# Patient Record
Sex: FEMALE | Race: WHITE | Employment: OTHER | URBAN - METROPOLITAN AREA
[De-identification: names, ages, dates, MRNs, and addresses within clinical notes are randomized per-mention and may not be internally consistent; named-entity substitution may affect disease eponyms.]

---

## 2017-03-01 PROBLEM — G47.33 OSA (OBSTRUCTIVE SLEEP APNEA): Status: ACTIVE | Noted: 2017-03-01

## 2018-02-15 PROBLEM — E03.9 HYPOTHYROIDISM: Status: ACTIVE | Noted: 2018-02-15

## 2018-04-14 ENCOUNTER — PATIENT MESSAGE (OUTPATIENT)
Dept: FAMILY MEDICINE CLINIC | Age: 48
End: 2018-04-14

## 2018-04-14 DIAGNOSIS — E23.0 PANHYPOPITUITARISM (HCC): ICD-10-CM

## 2018-04-16 RX ORDER — LEVOTHYROXINE SODIUM 0.05 MG/1
TABLET ORAL
Qty: 30 TABLET | Refills: 3 | Status: SHIPPED | OUTPATIENT
Start: 2018-04-16 | End: 2018-07-19 | Stop reason: SDUPTHER

## 2018-07-19 DIAGNOSIS — E23.0 PANHYPOPITUITARISM (HCC): ICD-10-CM

## 2018-07-19 RX ORDER — LEVOTHYROXINE SODIUM 0.05 MG/1
TABLET ORAL
Qty: 90 TABLET | Refills: 1 | Status: SHIPPED | OUTPATIENT
Start: 2018-07-19 | End: 2019-02-11 | Stop reason: SDUPTHER

## 2018-07-19 NOTE — TELEPHONE ENCOUNTER
From: Merlin Mcfarlane  Sent: 7/18/2018 8:54 PM EDT  Subject: Medication Renewal Request    Kanchan Arita.  Delores Carmona would like a refill of the following medications:     levothyroxine (SYNTHROID) 50 MCG tablet Heath Pineda DO]    Preferred pharmacy: [place] [id number]

## 2018-07-25 DIAGNOSIS — Z76.0 MEDICATION REFILL: ICD-10-CM

## 2018-07-25 RX ORDER — MULTIVIT-MIN/IRON/FOLIC ACID/K 18-600-40
2000 CAPSULE ORAL DAILY
Qty: 90 CAPSULE | Refills: 1 | Status: SHIPPED | OUTPATIENT
Start: 2018-07-25 | End: 2019-01-05 | Stop reason: SDUPTHER

## 2018-10-18 ENCOUNTER — TELEPHONE (OUTPATIENT)
Dept: FAMILY MEDICINE CLINIC | Age: 48
End: 2018-10-18

## 2018-10-18 DIAGNOSIS — E23.0 PANHYPOPITUITARISM (HCC): ICD-10-CM

## 2018-10-18 RX ORDER — HYDROCORTISONE 10 MG/1
TABLET ORAL
Qty: 30 TABLET | Refills: 5 | Status: SHIPPED | OUTPATIENT
Start: 2018-10-18 | End: 2019-02-15 | Stop reason: SDUPTHER

## 2019-01-05 DIAGNOSIS — Z76.0 MEDICATION REFILL: ICD-10-CM

## 2019-01-07 RX ORDER — ACETAMINOPHEN 160 MG
TABLET,DISINTEGRATING ORAL
Qty: 90 CAPSULE | Refills: 1 | Status: SHIPPED | OUTPATIENT
Start: 2019-01-07 | End: 2019-02-15 | Stop reason: SDUPTHER

## 2019-02-11 ENCOUNTER — TELEPHONE (OUTPATIENT)
Dept: FAMILY MEDICINE CLINIC | Age: 49
End: 2019-02-11

## 2019-02-11 DIAGNOSIS — E23.0 PANHYPOPITUITARISM (HCC): ICD-10-CM

## 2019-02-11 RX ORDER — LEVOTHYROXINE SODIUM 0.05 MG/1
TABLET ORAL
Qty: 30 TABLET | Refills: 0 | Status: SHIPPED | OUTPATIENT
Start: 2019-02-11 | End: 2019-02-15 | Stop reason: SDUPTHER

## 2019-02-15 ENCOUNTER — HOSPITAL ENCOUNTER (OUTPATIENT)
Age: 49
Discharge: HOME | End: 2019-02-17
Payer: COMMERCIAL

## 2019-02-15 ENCOUNTER — OFFICE VISIT (OUTPATIENT)
Dept: FAMILY MEDICINE CLINIC | Age: 49
End: 2019-02-15
Payer: COMMERCIAL

## 2019-02-15 VITALS
DIASTOLIC BLOOD PRESSURE: 80 MMHG | WEIGHT: 156 LBS | BODY MASS INDEX: 32.75 KG/M2 | HEIGHT: 58 IN | RESPIRATION RATE: 18 BRPM | TEMPERATURE: 98.5 F | OXYGEN SATURATION: 98 % | SYSTOLIC BLOOD PRESSURE: 118 MMHG | HEART RATE: 75 BPM

## 2019-02-15 DIAGNOSIS — E78.2 MIXED HYPERLIPIDEMIA: ICD-10-CM

## 2019-02-15 DIAGNOSIS — R53.83 FATIGUE, UNSPECIFIED TYPE: ICD-10-CM

## 2019-02-15 DIAGNOSIS — E55.9 VITAMIN D DEFICIENCY: ICD-10-CM

## 2019-02-15 DIAGNOSIS — E23.0 PANHYPOPITUITARISM (HCC): ICD-10-CM

## 2019-02-15 DIAGNOSIS — E03.9 HYPOTHYROIDISM, UNSPECIFIED TYPE: ICD-10-CM

## 2019-02-15 DIAGNOSIS — R73.01 IFG (IMPAIRED FASTING GLUCOSE): ICD-10-CM

## 2019-02-15 DIAGNOSIS — Z76.0 MEDICATION REFILL: ICD-10-CM

## 2019-02-15 DIAGNOSIS — E23.0 PANHYPOPITUITARISM (HCC): Primary | ICD-10-CM

## 2019-02-15 DIAGNOSIS — Q04.4 SEPTO-OPTIC DYSPLASIA (HCC): ICD-10-CM

## 2019-02-15 LAB
ALBUMIN SERPL-MCNC: 4.8 G/DL (ref 3.5–5.2)
ALP BLD-CCNC: 83 U/L (ref 35–104)
ALT SERPL-CCNC: 25 U/L (ref 0–32)
ANION GAP SERPL CALCULATED.3IONS-SCNC: 11 MMOL/L (ref 7–16)
AST SERPL-CCNC: 26 U/L (ref 0–31)
BASOPHILS ABSOLUTE: 0.07 E9/L (ref 0–0.2)
BASOPHILS RELATIVE PERCENT: 0.9 % (ref 0–2)
BILIRUB SERPL-MCNC: 0.4 MG/DL (ref 0–1.2)
BUN BLDV-MCNC: 13 MG/DL (ref 6–20)
CALCIUM SERPL-MCNC: 9.7 MG/DL (ref 8.6–10.2)
CHLORIDE BLD-SCNC: 101 MMOL/L (ref 98–107)
CHOLESTEROL, TOTAL: 245 MG/DL (ref 0–199)
CO2: 27 MMOL/L (ref 22–29)
CORTISOL TOTAL: 4.73 MCG/DL (ref 2.68–18.4)
CREAT SERPL-MCNC: 0.9 MG/DL (ref 0.5–1)
EOSINOPHILS ABSOLUTE: 0.1 E9/L (ref 0.05–0.5)
EOSINOPHILS RELATIVE PERCENT: 1.2 % (ref 0–6)
GFR AFRICAN AMERICAN: >60
GFR NON-AFRICAN AMERICAN: >60 ML/MIN/1.73
GLUCOSE BLD-MCNC: 121 MG/DL (ref 74–99)
HBA1C MFR BLD: 6.3 % (ref 4–5.6)
HCT VFR BLD CALC: 44.4 % (ref 34–48)
HDLC SERPL-MCNC: 54 MG/DL
HEMOGLOBIN: 14.5 G/DL (ref 11.5–15.5)
IMMATURE GRANULOCYTES #: 0.02 E9/L
IMMATURE GRANULOCYTES %: 0.2 % (ref 0–5)
LDL CHOLESTEROL CALCULATED: 167 MG/DL (ref 0–99)
LYMPHOCYTES ABSOLUTE: 2.99 E9/L (ref 1.5–4)
LYMPHOCYTES RELATIVE PERCENT: 37 % (ref 20–42)
MCH RBC QN AUTO: 28.4 PG (ref 26–35)
MCHC RBC AUTO-ENTMCNC: 32.7 % (ref 32–34.5)
MCV RBC AUTO: 86.9 FL (ref 80–99.9)
MONOCYTES ABSOLUTE: 0.43 E9/L (ref 0.1–0.95)
MONOCYTES RELATIVE PERCENT: 5.3 % (ref 2–12)
NEUTROPHILS ABSOLUTE: 4.48 E9/L (ref 1.8–7.3)
NEUTROPHILS RELATIVE PERCENT: 55.4 % (ref 43–80)
PDW BLD-RTO: 12.6 FL (ref 11.5–15)
PLATELET # BLD: 242 E9/L (ref 130–450)
PMV BLD AUTO: 12.4 FL (ref 7–12)
POTASSIUM SERPL-SCNC: 4.5 MMOL/L (ref 3.5–5)
RBC # BLD: 5.11 E12/L (ref 3.5–5.5)
SODIUM BLD-SCNC: 139 MMOL/L (ref 132–146)
TOTAL PROTEIN: 7.9 G/DL (ref 6.4–8.3)
TRIGL SERPL-MCNC: 118 MG/DL (ref 0–149)
TSH SERPL DL<=0.05 MIU/L-ACNC: 1.29 UIU/ML (ref 0.27–4.2)
VLDLC SERPL CALC-MCNC: 24 MG/DL
WBC # BLD: 8.1 E9/L (ref 4.5–11.5)

## 2019-02-15 PROCEDURE — G8417 CALC BMI ABV UP PARAM F/U: HCPCS | Performed by: FAMILY MEDICINE

## 2019-02-15 PROCEDURE — 84443 ASSAY THYROID STIM HORMONE: CPT

## 2019-02-15 PROCEDURE — 80053 COMPREHEN METABOLIC PANEL: CPT

## 2019-02-15 PROCEDURE — G8484 FLU IMMUNIZE NO ADMIN: HCPCS | Performed by: FAMILY MEDICINE

## 2019-02-15 PROCEDURE — G8427 DOCREV CUR MEDS BY ELIG CLIN: HCPCS | Performed by: FAMILY MEDICINE

## 2019-02-15 PROCEDURE — 83036 HEMOGLOBIN GLYCOSYLATED A1C: CPT

## 2019-02-15 PROCEDURE — 80061 LIPID PANEL: CPT

## 2019-02-15 PROCEDURE — 82533 TOTAL CORTISOL: CPT

## 2019-02-15 PROCEDURE — 1036F TOBACCO NON-USER: CPT | Performed by: FAMILY MEDICINE

## 2019-02-15 PROCEDURE — 85025 COMPLETE CBC W/AUTO DIFF WBC: CPT

## 2019-02-15 PROCEDURE — 82652 VIT D 1 25-DIHYDROXY: CPT

## 2019-02-15 PROCEDURE — 99214 OFFICE O/P EST MOD 30 MIN: CPT | Performed by: FAMILY MEDICINE

## 2019-02-15 RX ORDER — CHLORHEXIDINE GLUCONATE 0.12 MG/ML
RINSE ORAL
Qty: 473 ML | Refills: 1 | Status: SHIPPED | OUTPATIENT
Start: 2019-02-15 | End: 2019-08-31 | Stop reason: SDUPTHER

## 2019-02-15 RX ORDER — HYDROCORTISONE 10 MG/1
TABLET ORAL
Qty: 90 TABLET | Refills: 1 | Status: SHIPPED
Start: 2019-02-15 | End: 2020-02-13 | Stop reason: SDUPTHER

## 2019-02-15 RX ORDER — MELATONIN
1000 DAILY
Qty: 90 TABLET | Refills: 1 | OUTPATIENT
Start: 2019-02-15 | End: 2020-02-13

## 2019-02-15 RX ORDER — ACETAMINOPHEN 160 MG
TABLET,DISINTEGRATING ORAL
Qty: 90 CAPSULE | Refills: 1 | Status: SHIPPED | OUTPATIENT
Start: 2019-02-15 | End: 2019-12-30

## 2019-02-15 RX ORDER — AMOXICILLIN 500 MG
1 CAPSULE ORAL DAILY
Qty: 90 CAPSULE | Refills: 1 | OUTPATIENT
Start: 2019-02-15 | End: 2019-02-20 | Stop reason: SDUPTHER

## 2019-02-15 RX ORDER — LEVOTHYROXINE SODIUM 0.05 MG/1
TABLET ORAL
Qty: 90 TABLET | Refills: 1 | Status: SHIPPED | OUTPATIENT
Start: 2019-02-15 | End: 2019-08-31 | Stop reason: SDUPTHER

## 2019-02-15 ASSESSMENT — PATIENT HEALTH QUESTIONNAIRE - PHQ9
SUM OF ALL RESPONSES TO PHQ9 QUESTIONS 1 & 2: 0
SUM OF ALL RESPONSES TO PHQ QUESTIONS 1-9: 0
2. FEELING DOWN, DEPRESSED OR HOPELESS: 0
1. LITTLE INTEREST OR PLEASURE IN DOING THINGS: 0
SUM OF ALL RESPONSES TO PHQ QUESTIONS 1-9: 0

## 2019-02-18 PROBLEM — E78.2 MIXED HYPERLIPIDEMIA: Status: ACTIVE | Noted: 2019-02-18

## 2019-02-18 LAB — VITAMIN D 1,25-DIHYDROXY: 40 PG/ML (ref 19.9–79.3)

## 2019-02-18 ASSESSMENT — ENCOUNTER SYMPTOMS
ABDOMINAL PAIN: 0
FACIAL SWELLING: 0
SHORTNESS OF BREATH: 0
RECTAL PAIN: 0
SINUS PAIN: 0
PHOTOPHOBIA: 0
WHEEZING: 0
ANAL BLEEDING: 0
TROUBLE SWALLOWING: 0
EYE PAIN: 0
EYE REDNESS: 0
DIARRHEA: 0
EYE ITCHING: 0
SINUS PRESSURE: 0
VOMITING: 0
ALLERGIC/IMMUNOLOGIC NEGATIVE: 1
VOICE CHANGE: 0
STRIDOR: 0
COUGH: 0
RESPIRATORY NEGATIVE: 1
COLOR CHANGE: 0
CONSTIPATION: 0
CHOKING: 0
NAUSEA: 0
SORE THROAT: 0
BACK PAIN: 0
EYE DISCHARGE: 0
ABDOMINAL DISTENTION: 0
RHINORRHEA: 0
BLOOD IN STOOL: 0
CHEST TIGHTNESS: 0

## 2019-02-19 ENCOUNTER — PATIENT MESSAGE (OUTPATIENT)
Dept: FAMILY MEDICINE CLINIC | Age: 49
End: 2019-02-19

## 2019-02-20 RX ORDER — AMOXICILLIN 500 MG
2 CAPSULE ORAL DAILY
Qty: 180 CAPSULE | Refills: 1 | Status: SHIPPED
Start: 2019-02-20 | End: 2020-03-16

## 2019-07-10 ENCOUNTER — HOSPITAL ENCOUNTER (EMERGENCY)
Age: 49
Discharge: HOME | End: 2019-07-10
Attending: FAMILY MEDICINE
Payer: COMMERCIAL

## 2019-07-10 VITALS
HEIGHT: 59 IN | WEIGHT: 145 LBS | RESPIRATION RATE: 16 BRPM | OXYGEN SATURATION: 97 % | TEMPERATURE: 98 F | HEART RATE: 72 BPM | BODY MASS INDEX: 29.23 KG/M2 | DIASTOLIC BLOOD PRESSURE: 88 MMHG | SYSTOLIC BLOOD PRESSURE: 148 MMHG

## 2019-07-10 DIAGNOSIS — K04.7 DENTAL ABSCESS: Primary | ICD-10-CM

## 2019-07-10 PROCEDURE — 99282 EMERGENCY DEPT VISIT SF MDM: CPT

## 2019-07-10 RX ORDER — AMOXICILLIN 500 MG/1
500 CAPSULE ORAL 3 TIMES DAILY
Qty: 30 CAPSULE | Refills: 0 | Status: SHIPPED | OUTPATIENT
Start: 2019-07-10 | End: 2019-07-20

## 2019-07-10 RX ORDER — IBUPROFEN 800 MG/1
800 TABLET ORAL EVERY 8 HOURS PRN
Qty: 21 TABLET | Refills: 0 | Status: SHIPPED | OUTPATIENT
Start: 2019-07-10 | End: 2020-03-05

## 2019-07-10 ASSESSMENT — PAIN DESCRIPTION - ORIENTATION: ORIENTATION: RIGHT;UPPER

## 2019-07-10 ASSESSMENT — PAIN DESCRIPTION - PAIN TYPE: TYPE: ACUTE PAIN

## 2019-07-10 ASSESSMENT — PAIN DESCRIPTION - DESCRIPTORS: DESCRIPTORS: ACHING;THROBBING;STABBING

## 2019-07-10 ASSESSMENT — PAIN SCALES - GENERAL: PAINLEVEL_OUTOF10: 9

## 2019-07-10 ASSESSMENT — PAIN DESCRIPTION - FREQUENCY: FREQUENCY: CONTINUOUS

## 2019-07-10 ASSESSMENT — PAIN DESCRIPTION - LOCATION: LOCATION: TEETH

## 2019-07-11 NOTE — ED PROVIDER NOTES
52 yo female with toothache without facial swelling fever or chills   Dentist out of town   History of periodontal disease   nonsmoker          Review of Systems   HENT: Positive for dental problem. All other systems reviewed and are negative. Physical Exam   Constitutional: She is oriented to person, place, and time. She appears well-developed and well-nourished. HENT:   Head: Normocephalic and atraumatic. Mouth/Throat: Dental abscesses and dental caries present. Eyes: Pupils are equal, round, and reactive to light. Neck: Normal range of motion. Neck supple. Cardiovascular: Normal rate, regular rhythm and normal heart sounds. No murmur heard. Pulmonary/Chest: Effort normal and breath sounds normal. No respiratory distress. She has no wheezes. She has no rales. Abdominal: Soft. Bowel sounds are normal. There is no tenderness. There is no rebound and no guarding. Musculoskeletal: She exhibits no edema. Neurological: She is alert and oriented to person, place, and time. No cranial nerve deficit. Coordination normal.   Skin: Skin is warm and dry. Nursing note and vitals reviewed. Procedures    MDM         --------------------------------------------- PAST HISTORY ---------------------------------------------  Past Medical History:  has a past medical history of Blind, Hypoglycemia, Hypothyroidism, Osteopenia, Pituitary abnormality ([facility] [address] [facility] and Sleep apnea. Past Surgical History:  has a past surgical history that includes skin biopsy (2009) and Tonsillectomy (1978). Social History:  reports that she has never smoked. She has never used smokeless tobacco. She reports that she does not drink alcohol or use drugs. Family History: family history includes Cancer in her maternal grandmother; Heart Disease in her father; No Known Problems in her sister and sister; Other in her mother. The patients home medications have been reviewed.     Allergies: Patient has no known allergies. -------------------------------------------------- RESULTS -------------------------------------------------  No results found for this visit on 07/10/19. No orders to display       ------------------------- NURSING NOTES AND VITALS REVIEWED ---------------------------   The nursing notes within the ED encounter and vital signs as below have been reviewed. BP (!) 162/112   Pulse 72   Temp 98 °F (36.7 °C) (Oral)   Resp 16   Ht 4' 10.5\" (1.486 m)   Wt 145 lb (65.8 kg)   LMP 09/27/2011 (Exact Date)   SpO2 97%   BMI 29.79 kg/m²   Oxygen Saturation Interpretation: Normal      ------------------------------------------ PROGRESS NOTES ------------------------------------------   I have spoken with the patient and discussed todays results, in addition to providing specific details for the plan of care and counseling regarding the diagnosis and prognosis. Their questions are answered at this time and they are agreeable with the plan.      --------------------------------- ADDITIONAL PROVIDER NOTES ---------------------------------     1. Dental abscess           This patient is stable for discharge. I have shared the specific conditions for return, as well as the importance of follow-up.            Drexel Bamberger, DO  07/10/19 1040 [facility],   07/10/19 2730

## 2019-08-31 DIAGNOSIS — E03.9 HYPOTHYROIDISM, UNSPECIFIED TYPE: ICD-10-CM

## 2019-09-03 RX ORDER — LEVOTHYROXINE SODIUM 0.05 MG/1
TABLET ORAL
Qty: 90 TABLET | Refills: 1 | Status: SHIPPED
Start: 2019-09-03 | End: 2020-02-13 | Stop reason: SDUPTHER

## 2019-09-03 RX ORDER — CHLORHEXIDINE GLUCONATE 0.12 MG/ML
RINSE ORAL
Qty: 1 BOTTLE | Refills: 1 | Status: SHIPPED
Start: 2019-09-03 | End: 2021-04-09 | Stop reason: SDUPTHER

## 2019-12-28 DIAGNOSIS — E23.0 PANHYPOPITUITARISM (HCC): ICD-10-CM

## 2019-12-30 RX ORDER — MULTIVIT-MIN/IRON/FOLIC ACID/K 18-600-40
CAPSULE ORAL
Qty: 90 CAPSULE | Refills: 0 | Status: SHIPPED
Start: 2019-12-30 | End: 2020-07-15 | Stop reason: SDUPTHER

## 2020-02-13 ENCOUNTER — OFFICE VISIT (OUTPATIENT)
Dept: FAMILY MEDICINE CLINIC | Age: 50
End: 2020-02-13
Payer: COMMERCIAL

## 2020-02-13 VITALS
WEIGHT: 158 LBS | BODY MASS INDEX: 31.85 KG/M2 | HEIGHT: 59 IN | DIASTOLIC BLOOD PRESSURE: 76 MMHG | HEART RATE: 73 BPM | RESPIRATION RATE: 16 BRPM | SYSTOLIC BLOOD PRESSURE: 132 MMHG

## 2020-02-13 PROCEDURE — 1036F TOBACCO NON-USER: CPT | Performed by: FAMILY MEDICINE

## 2020-02-13 PROCEDURE — 99213 OFFICE O/P EST LOW 20 MIN: CPT | Performed by: FAMILY MEDICINE

## 2020-02-13 PROCEDURE — G8427 DOCREV CUR MEDS BY ELIG CLIN: HCPCS | Performed by: FAMILY MEDICINE

## 2020-02-13 PROCEDURE — G8482 FLU IMMUNIZE ORDER/ADMIN: HCPCS | Performed by: FAMILY MEDICINE

## 2020-02-13 PROCEDURE — G8417 CALC BMI ABV UP PARAM F/U: HCPCS | Performed by: FAMILY MEDICINE

## 2020-02-13 RX ORDER — LEVOTHYROXINE SODIUM 0.05 MG/1
TABLET ORAL
Qty: 90 TABLET | Refills: 1 | Status: SHIPPED
Start: 2020-02-13 | End: 2020-07-15 | Stop reason: SDUPTHER

## 2020-02-13 RX ORDER — HYDROCORTISONE 10 MG/1
TABLET ORAL
Qty: 90 TABLET | Refills: 1 | Status: SHIPPED
Start: 2020-02-13 | End: 2020-07-15 | Stop reason: SDUPTHER

## 2020-02-13 ASSESSMENT — PATIENT HEALTH QUESTIONNAIRE - PHQ9
SUM OF ALL RESPONSES TO PHQ9 QUESTIONS 1 & 2: 0
SUM OF ALL RESPONSES TO PHQ QUESTIONS 1-9: 0
SUM OF ALL RESPONSES TO PHQ QUESTIONS 1-9: 0
1. LITTLE INTEREST OR PLEASURE IN DOING THINGS: 0
2. FEELING DOWN, DEPRESSED OR HOPELESS: 0

## 2020-03-10 ENCOUNTER — PREP FOR PROCEDURE (OUTPATIENT)
Dept: SURGERY | Age: 50
End: 2020-03-10

## 2020-03-10 DIAGNOSIS — Q04.4 SEPTO-OPTIC DYSPLASIA (HCC): Primary | ICD-10-CM

## 2020-03-10 RX ORDER — SODIUM CHLORIDE 0.9 % (FLUSH) 0.9 %
10 SYRINGE (ML) INJECTION PRN
Status: CANCELLED | OUTPATIENT
Start: 2020-03-10

## 2020-03-10 RX ORDER — SODIUM CHLORIDE 0.9 % (FLUSH) 0.9 %
10 SYRINGE (ML) INJECTION EVERY 12 HOURS SCHEDULED
Status: CANCELLED | OUTPATIENT
Start: 2020-03-10

## 2020-03-10 RX ORDER — SODIUM CHLORIDE 9 MG/ML
INJECTION, SOLUTION INTRAVENOUS CONTINUOUS
Status: CANCELLED | OUTPATIENT
Start: 2020-03-11

## 2020-03-11 ENCOUNTER — HOSPITAL ENCOUNTER (OUTPATIENT)
Age: 50
Setting detail: OUTPATIENT SURGERY
Discharge: HOME | End: 2020-03-11
Attending: ORAL & MAXILLOFACIAL SURGERY | Admitting: ORAL & MAXILLOFACIAL SURGERY
Payer: COMMERCIAL

## 2020-03-11 ENCOUNTER — ANESTHESIA EVENT (OUTPATIENT)
Dept: OPERATING ROOM | Age: 50
End: 2020-03-11
Payer: COMMERCIAL

## 2020-03-11 ENCOUNTER — ANESTHESIA (OUTPATIENT)
Dept: OPERATING ROOM | Age: 50
End: 2020-03-11
Payer: COMMERCIAL

## 2020-03-11 VITALS
DIASTOLIC BLOOD PRESSURE: 72 MMHG | BODY MASS INDEX: 31.85 KG/M2 | RESPIRATION RATE: 13 BRPM | HEIGHT: 59 IN | HEART RATE: 67 BPM | SYSTOLIC BLOOD PRESSURE: 147 MMHG | WEIGHT: 158 LBS | TEMPERATURE: 98.2 F | OXYGEN SATURATION: 95 %

## 2020-03-11 VITALS
RESPIRATION RATE: 1 BRPM | DIASTOLIC BLOOD PRESSURE: 79 MMHG | OXYGEN SATURATION: 99 % | SYSTOLIC BLOOD PRESSURE: 152 MMHG

## 2020-03-11 LAB — METER GLUCOSE: 112 MG/DL (ref 74–99)

## 2020-03-11 PROCEDURE — 3700000000 HC ANESTHESIA ATTENDED CARE: Performed by: ORAL & MAXILLOFACIAL SURGERY

## 2020-03-11 PROCEDURE — 6370000000 HC RX 637 (ALT 250 FOR IP): Performed by: NURSE ANESTHETIST, CERTIFIED REGISTERED

## 2020-03-11 PROCEDURE — 7100000001 HC PACU RECOVERY - ADDTL 15 MIN: Performed by: ORAL & MAXILLOFACIAL SURGERY

## 2020-03-11 PROCEDURE — 7100000010 HC PHASE II RECOVERY - FIRST 15 MIN: Performed by: ORAL & MAXILLOFACIAL SURGERY

## 2020-03-11 PROCEDURE — 7100000011 HC PHASE II RECOVERY - ADDTL 15 MIN: Performed by: ORAL & MAXILLOFACIAL SURGERY

## 2020-03-11 PROCEDURE — 3600000012 HC SURGERY LEVEL 2 ADDTL 15MIN: Performed by: ORAL & MAXILLOFACIAL SURGERY

## 2020-03-11 PROCEDURE — 2500000003 HC RX 250 WO HCPCS: Performed by: NURSE ANESTHETIST, CERTIFIED REGISTERED

## 2020-03-11 PROCEDURE — 3600000002 HC SURGERY LEVEL 2 BASE: Performed by: ORAL & MAXILLOFACIAL SURGERY

## 2020-03-11 PROCEDURE — 7100000000 HC PACU RECOVERY - FIRST 15 MIN: Performed by: ORAL & MAXILLOFACIAL SURGERY

## 2020-03-11 PROCEDURE — 2580000003 HC RX 258: Performed by: NURSE ANESTHETIST, CERTIFIED REGISTERED

## 2020-03-11 PROCEDURE — 6370000000 HC RX 637 (ALT 250 FOR IP): Performed by: ORAL & MAXILLOFACIAL SURGERY

## 2020-03-11 PROCEDURE — 3700000001 HC ADD 15 MINUTES (ANESTHESIA): Performed by: ORAL & MAXILLOFACIAL SURGERY

## 2020-03-11 PROCEDURE — 6360000002 HC RX W HCPCS: Performed by: NURSE ANESTHETIST, CERTIFIED REGISTERED

## 2020-03-11 PROCEDURE — 2709999900 HC NON-CHARGEABLE SUPPLY: Performed by: ORAL & MAXILLOFACIAL SURGERY

## 2020-03-11 PROCEDURE — 2500000003 HC RX 250 WO HCPCS: Performed by: ORAL & MAXILLOFACIAL SURGERY

## 2020-03-11 PROCEDURE — 82962 GLUCOSE BLOOD TEST: CPT

## 2020-03-11 PROCEDURE — 6360000002 HC RX W HCPCS: Performed by: ORAL & MAXILLOFACIAL SURGERY

## 2020-03-11 RX ORDER — SODIUM CHLORIDE, SODIUM LACTATE, POTASSIUM CHLORIDE, CALCIUM CHLORIDE 600; 310; 30; 20 MG/100ML; MG/100ML; MG/100ML; MG/100ML
INJECTION, SOLUTION INTRAVENOUS CONTINUOUS PRN
Status: DISCONTINUED | OUTPATIENT
Start: 2020-03-11 | End: 2020-03-11 | Stop reason: SDUPTHER

## 2020-03-11 RX ORDER — MIDAZOLAM HYDROCHLORIDE 1 MG/ML
INJECTION INTRAMUSCULAR; INTRAVENOUS PRN
Status: DISCONTINUED | OUTPATIENT
Start: 2020-03-11 | End: 2020-03-11 | Stop reason: SDUPTHER

## 2020-03-11 RX ORDER — LABETALOL HYDROCHLORIDE 5 MG/ML
INJECTION, SOLUTION INTRAVENOUS PRN
Status: DISCONTINUED | OUTPATIENT
Start: 2020-03-11 | End: 2020-03-11 | Stop reason: SDUPTHER

## 2020-03-11 RX ORDER — NALOXONE HYDROCHLORIDE 0.4 MG/ML
INJECTION, SOLUTION INTRAMUSCULAR; INTRAVENOUS; SUBCUTANEOUS PRN
Status: DISCONTINUED | OUTPATIENT
Start: 2020-03-11 | End: 2020-03-11 | Stop reason: SDUPTHER

## 2020-03-11 RX ORDER — SODIUM CHLORIDE 0.9 % (FLUSH) 0.9 %
10 SYRINGE (ML) INJECTION EVERY 12 HOURS SCHEDULED
Status: DISCONTINUED | OUTPATIENT
Start: 2020-03-11 | End: 2020-03-11 | Stop reason: HOSPADM

## 2020-03-11 RX ORDER — PROMETHAZINE HYDROCHLORIDE 25 MG/ML
6.25 INJECTION, SOLUTION INTRAMUSCULAR; INTRAVENOUS
Status: DISCONTINUED | OUTPATIENT
Start: 2020-03-11 | End: 2020-03-11 | Stop reason: HOSPADM

## 2020-03-11 RX ORDER — LIDOCAINE HYDROCHLORIDE AND EPINEPHRINE 10; 10 MG/ML; UG/ML
INJECTION, SOLUTION INFILTRATION; PERINEURAL PRN
Status: DISCONTINUED | OUTPATIENT
Start: 2020-03-11 | End: 2020-03-11 | Stop reason: ALTCHOICE

## 2020-03-11 RX ORDER — FENTANYL CITRATE 50 UG/ML
INJECTION, SOLUTION INTRAMUSCULAR; INTRAVENOUS PRN
Status: DISCONTINUED | OUTPATIENT
Start: 2020-03-11 | End: 2020-03-11 | Stop reason: SDUPTHER

## 2020-03-11 RX ORDER — DEXAMETHASONE SODIUM PHOSPHATE 10 MG/ML
8 INJECTION, SOLUTION INTRAMUSCULAR; INTRAVENOUS ONCE
Status: COMPLETED | OUTPATIENT
Start: 2020-03-11 | End: 2020-03-11

## 2020-03-11 RX ORDER — ONDANSETRON 2 MG/ML
INJECTION INTRAMUSCULAR; INTRAVENOUS PRN
Status: DISCONTINUED | OUTPATIENT
Start: 2020-03-11 | End: 2020-03-11 | Stop reason: SDUPTHER

## 2020-03-11 RX ORDER — NEOSTIGMINE METHYLSULFATE 1 MG/ML
INJECTION, SOLUTION INTRAVENOUS PRN
Status: DISCONTINUED | OUTPATIENT
Start: 2020-03-11 | End: 2020-03-11 | Stop reason: SDUPTHER

## 2020-03-11 RX ORDER — GLYCOPYRROLATE 1 MG/5 ML
SYRINGE (ML) INTRAVENOUS PRN
Status: DISCONTINUED | OUTPATIENT
Start: 2020-03-11 | End: 2020-03-11 | Stop reason: SDUPTHER

## 2020-03-11 RX ORDER — PROPOFOL 10 MG/ML
INJECTION, EMULSION INTRAVENOUS PRN
Status: DISCONTINUED | OUTPATIENT
Start: 2020-03-11 | End: 2020-03-11 | Stop reason: SDUPTHER

## 2020-03-11 RX ORDER — SODIUM CHLORIDE 9 MG/ML
INJECTION, SOLUTION INTRAVENOUS CONTINUOUS
Status: DISCONTINUED | OUTPATIENT
Start: 2020-03-11 | End: 2020-03-11 | Stop reason: HOSPADM

## 2020-03-11 RX ORDER — ROCURONIUM BROMIDE 10 MG/ML
INJECTION, SOLUTION INTRAVENOUS PRN
Status: DISCONTINUED | OUTPATIENT
Start: 2020-03-11 | End: 2020-03-11 | Stop reason: SDUPTHER

## 2020-03-11 RX ORDER — OXYMETAZOLINE HYDROCHLORIDE 0.05 G/100ML
SPRAY NASAL PRN
Status: DISCONTINUED | OUTPATIENT
Start: 2020-03-11 | End: 2020-03-11 | Stop reason: SDUPTHER

## 2020-03-11 RX ORDER — LIDOCAINE HYDROCHLORIDE 20 MG/ML
INJECTION, SOLUTION EPIDURAL; INFILTRATION; INTRACAUDAL; PERINEURAL PRN
Status: DISCONTINUED | OUTPATIENT
Start: 2020-03-11 | End: 2020-03-11 | Stop reason: SDUPTHER

## 2020-03-11 RX ORDER — DEXAMETHASONE SODIUM PHOSPHATE 4 MG/ML
INJECTION, SOLUTION INTRA-ARTICULAR; INTRALESIONAL; INTRAMUSCULAR; INTRAVENOUS; SOFT TISSUE PRN
Status: DISCONTINUED | OUTPATIENT
Start: 2020-03-11 | End: 2020-03-11 | Stop reason: SDUPTHER

## 2020-03-11 RX ORDER — SODIUM CHLORIDE 0.9 % (FLUSH) 0.9 %
10 SYRINGE (ML) INJECTION PRN
Status: DISCONTINUED | OUTPATIENT
Start: 2020-03-11 | End: 2020-03-11 | Stop reason: HOSPADM

## 2020-03-11 RX ADMIN — ONDANSETRON HYDROCHLORIDE 4 MG: 2 INJECTION, SOLUTION INTRAMUSCULAR; INTRAVENOUS at 07:30

## 2020-03-11 RX ADMIN — ROCURONIUM BROMIDE 30 MG: 10 SOLUTION INTRAVENOUS at 07:18

## 2020-03-11 RX ADMIN — LABETALOL HYDROCHLORIDE 5 MG: 5 INJECTION INTRAVENOUS at 07:49

## 2020-03-11 RX ADMIN — FENTANYL CITRATE 50 MCG: 50 INJECTION, SOLUTION INTRAMUSCULAR; INTRAVENOUS at 07:28

## 2020-03-11 RX ADMIN — NALOXONE HYDROCHLORIDE 0.08 MG: 0.4 INJECTION, SOLUTION INTRAMUSCULAR; INTRAVENOUS; SUBCUTANEOUS at 08:20

## 2020-03-11 RX ADMIN — LABETALOL HYDROCHLORIDE 5 MG: 5 INJECTION INTRAVENOUS at 08:30

## 2020-03-11 RX ADMIN — OXYMETAZOLINE HYDROCHLORIDE 1 SPRAY: 5 SPRAY NASAL at 07:00

## 2020-03-11 RX ADMIN — Medication 3 MG: at 08:05

## 2020-03-11 RX ADMIN — DEXAMETHASONE SODIUM PHOSPHATE 8 MG: 4 INJECTION, SOLUTION INTRAMUSCULAR; INTRAVENOUS at 07:30

## 2020-03-11 RX ADMIN — LIDOCAINE HYDROCHLORIDE 100 MG: 20 INJECTION, SOLUTION EPIDURAL; INFILTRATION; INTRACAUDAL; PERINEURAL at 07:18

## 2020-03-11 RX ADMIN — Medication 0.6 MG: at 08:05

## 2020-03-11 RX ADMIN — LABETALOL HYDROCHLORIDE 5 MG: 5 INJECTION INTRAVENOUS at 08:02

## 2020-03-11 RX ADMIN — SODIUM CHLORIDE, POTASSIUM CHLORIDE, SODIUM LACTATE AND CALCIUM CHLORIDE: 600; 310; 30; 20 INJECTION, SOLUTION INTRAVENOUS at 07:00

## 2020-03-11 RX ADMIN — SODIUM CHLORIDE, POTASSIUM CHLORIDE, SODIUM LACTATE AND CALCIUM CHLORIDE: 600; 310; 30; 20 INJECTION, SOLUTION INTRAVENOUS at 08:00

## 2020-03-11 RX ADMIN — PROPOFOL 200 MG: 10 INJECTION, EMULSION INTRAVENOUS at 07:18

## 2020-03-11 RX ADMIN — Medication 0.2 MG: at 07:00

## 2020-03-11 RX ADMIN — MIDAZOLAM 2 MG: 1 INJECTION INTRAMUSCULAR; INTRAVENOUS at 07:00

## 2020-03-11 RX ADMIN — FENTANYL CITRATE 50 MCG: 50 INJECTION, SOLUTION INTRAMUSCULAR; INTRAVENOUS at 07:43

## 2020-03-11 RX ADMIN — DEXAMETHASONE SODIUM PHOSPHATE 8 MG: 10 INJECTION, SOLUTION INTRAMUSCULAR; INTRAVENOUS at 06:30

## 2020-03-11 RX ADMIN — FENTANYL CITRATE 100 MCG: 50 INJECTION, SOLUTION INTRAMUSCULAR; INTRAVENOUS at 07:18

## 2020-03-11 ASSESSMENT — PULMONARY FUNCTION TESTS
PIF_VALUE: 25
PIF_VALUE: 30
PIF_VALUE: 22
PIF_VALUE: 1
PIF_VALUE: 29
PIF_VALUE: 2
PIF_VALUE: 1
PIF_VALUE: 29
PIF_VALUE: 21
PIF_VALUE: 23
PIF_VALUE: 29
PIF_VALUE: 30
PIF_VALUE: 21
PIF_VALUE: 22
PIF_VALUE: 25
PIF_VALUE: 1
PIF_VALUE: 22
PIF_VALUE: 32
PIF_VALUE: 29
PIF_VALUE: 22
PIF_VALUE: 1
PIF_VALUE: 30
PIF_VALUE: 25
PIF_VALUE: 23
PIF_VALUE: 29
PIF_VALUE: 30
PIF_VALUE: 28
PIF_VALUE: 1
PIF_VALUE: 22
PIF_VALUE: 25
PIF_VALUE: 29
PIF_VALUE: 2
PIF_VALUE: 1
PIF_VALUE: 21
PIF_VALUE: 22
PIF_VALUE: 18
PIF_VALUE: 22
PIF_VALUE: 22
PIF_VALUE: 0
PIF_VALUE: 1
PIF_VALUE: 30
PIF_VALUE: 25
PIF_VALUE: 22
PIF_VALUE: 29
PIF_VALUE: 2
PIF_VALUE: 27
PIF_VALUE: 29
PIF_VALUE: 23
PIF_VALUE: 30
PIF_VALUE: 25
PIF_VALUE: 30
PIF_VALUE: 6
PIF_VALUE: 2
PIF_VALUE: 1
PIF_VALUE: 30
PIF_VALUE: 30
PIF_VALUE: 25
PIF_VALUE: 22
PIF_VALUE: 15
PIF_VALUE: 23
PIF_VALUE: 2
PIF_VALUE: 25
PIF_VALUE: 25
PIF_VALUE: 23
PIF_VALUE: 15
PIF_VALUE: 30
PIF_VALUE: 29
PIF_VALUE: 25
PIF_VALUE: 22
PIF_VALUE: 22
PIF_VALUE: 23
PIF_VALUE: 22
PIF_VALUE: 30
PIF_VALUE: 25
PIF_VALUE: 30
PIF_VALUE: 31

## 2020-03-11 ASSESSMENT — PAIN SCALES - GENERAL
PAINLEVEL_OUTOF10: 0

## 2020-03-11 ASSESSMENT — PAIN DESCRIPTION - LOCATION: LOCATION: TEETH;MOUTH

## 2020-03-11 ASSESSMENT — LIFESTYLE VARIABLES: SMOKING_STATUS: 0

## 2020-03-11 NOTE — ANESTHESIA PRE PROCEDURE
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     Sleep apnea        Past Surgical History:        Procedure Laterality Date    SKIN BIOPSY  2009    TONSILLECTOMY  1978       Social History:    Social History     Tobacco Use    Smoking status: Never Smoker    Smokeless tobacco: Never Used   Substance Use Topics    Alcohol use: No                                Counseling given: Not Answered      Vital Signs (Current):   Vitals:    03/05/20 1600 03/11/20 0617   BP:  (!) 176/93   Pulse:  71   Resp:  16   Temp:  97.2 °F (36.2 °C)   TempSrc:  Temporal   SpO2:  96%   Weight: 158 lb (71.7 kg) 158 lb (71.7 kg)   Height: 4' 10.5\" (1.486 m) 4' 10.5\" (1.486 m)                                              BP Readings from Last 3 Encounters:   03/11/20 (!) 176/93   02/13/20 132/76   01/21/20 (!) 176/100       NPO Status: Time of last liquid consumption: 1930                        Time of last solid consumption: 1930                        Date of last liquid consumption: 03/10/20                        Date of last solid food consumption: 03/10/20    BMI:   Wt Readings from Last 3 Encounters:   03/11/20 158 lb (71.7 kg)   02/13/20 158 lb (71.7 kg)   01/21/20 156 lb (70.8 kg)     Body mass index is 32.46 kg/m². CBC:   Lab Results   Component Value Date    WBC 8.1 02/15/2019    RBC 5.11 02/15/2019    HGB 14.5 02/15/2019    HCT 44.4 02/15/2019    MCV 86.9 02/15/2019    RDW 12.6 02/15/2019     02/15/2019       CMP:   Lab Results   Component Value Date     02/15/2019    K 4.5 02/15/2019     02/15/2019    CO2 27 02/15/2019    BUN 13 02/15/2019    CREATININE 0.9 02/15/2019    GFRAA >60 02/15/2019    LABGLOM >60 02/15/2019    GLUCOSE 121 02/15/2019    PROT 7.9 02/15/2019    CALCIUM 9.7 02/15/2019    BILITOT 0.4 02/15/2019    ALKPHOS 83 02/15/2019    AST 26 02/15/2019    ALT 25 02/15/2019       POC Tests: No results for input(s): POCGLU, POCNA, POCK, POCCL, POCBUN, POCHEMO, POCHCT in the last 72 hours.     Coags: No results found for: PROTIME, INR, APTT    HCG (If Applicable): No results found for: PREGTESTUR, PREGSERUM, HCG, HCGQUANT     ABGs: No results found for: PHART, PO2ART, BYX1ZZO, AGX5PFF, BEART, [id number]     Type & Screen (If Applicable):  No results found for: LABABO, [address]    Anesthesia Evaluation  Patient summary reviewed and Nursing notes reviewed no history of anesthetic complications:   Airway: Mallampati: IV  TM distance: >3 FB   Neck ROM: full  Mouth opening: < 3 FB Dental:      Comment: Very poor teeth- very loose    Pulmonary: breath sounds clear to auscultation  (+) sleep apnea: on CPAP,      (-) not a current smoker                           Cardiovascular:  Exercise tolerance: good (>4 METS),           Rhythm: regular  Rate: normal                    Neuro/Psych:   Negative Neuro/Psych ROS              GI/Hepatic/Renal: Neg GI/Hepatic/Renal ROS            Endo/Other:    (+) hypothyroidism::., .                 Abdominal:           Vascular: negative vascular ROS. Anesthesia Plan      general     ASA 3       Induction: intravenous. MIPS: Postoperative opioids intended and Prophylactic antiemetics administered. Anesthetic plan and risks discussed with patient, spouse and mother. Plan discussed with CRNA and surgical team.            DOS STAFF ADDENDUM:    Pt seen and examined, physical exam updated, chart reviewed including anesthesia, drug and allergy history. H&P reviewed. No interval changes to history or physical examination (unless noted above). NPO status confirmed. Anesthetic plan, risks, benefits, alternatives discussed with patient. Patient verbalized an understanding and agrees to proceed.            Doc Hinson MD   3/11/2020

## 2020-03-11 NOTE — PROGRESS NOTES
1330  Scripts for Amoxicillin and Norco found in patient's chart. Spoke with patient's , requests Amox to be called into [facility]. Instructed to take OTC pain meds and notify Dr. Braden Brown if needs pain Rx. Dr. Rachel Velez office notfied of problem with scripts and states they will call in Amox.

## 2020-03-16 RX ORDER — AMOXICILLIN 500 MG
CAPSULE ORAL
Qty: 180 CAPSULE | Refills: 0 | Status: SHIPPED | OUTPATIENT
Start: 2020-03-16

## 2020-05-06 RX ORDER — MULTIVIT-MIN/IRON/FOLIC ACID/K 18-600-40
CAPSULE ORAL
Qty: 90 CAPSULE | Refills: 0 | OUTPATIENT
Start: 2020-05-06

## 2020-07-15 ENCOUNTER — VIRTUAL VISIT (OUTPATIENT)
Dept: FAMILY MEDICINE CLINIC | Age: 50
End: 2020-07-15
Payer: COMMERCIAL

## 2020-07-15 PROCEDURE — 99442 PR PHYS/QHP TELEPHONE EVALUATION 11-20 MIN: CPT | Performed by: FAMILY MEDICINE

## 2020-07-15 RX ORDER — MULTIVIT-MIN/IRON/FOLIC ACID/K 18-600-40
CAPSULE ORAL
Qty: 90 CAPSULE | Refills: 1 | Status: SHIPPED
Start: 2020-07-15 | End: 2021-04-09 | Stop reason: SDUPTHER

## 2020-07-15 RX ORDER — LEVOTHYROXINE SODIUM 0.05 MG/1
TABLET ORAL
Qty: 90 TABLET | Refills: 1 | Status: SHIPPED
Start: 2020-07-15 | End: 2021-02-04

## 2020-07-15 RX ORDER — HYDROCORTISONE 10 MG/1
TABLET ORAL
Qty: 90 TABLET | Refills: 1 | Status: SHIPPED
Start: 2020-07-15 | End: 2021-04-09 | Stop reason: SDUPTHER

## 2020-07-16 ENCOUNTER — TELEPHONE (OUTPATIENT)
Dept: FAMILY MEDICINE CLINIC | Age: 50
End: 2020-07-16

## 2020-07-16 NOTE — TELEPHONE ENCOUNTER
Ryan Fuentes     Key: [id number]      Status  Sent to Plan - today    Drug  Cortef 10MG tablets    Form  [employer] Electronic PA Form    Electronically signed by Shashi Finn MA on 7/16/20 at 9:40 AM EDT

## 2020-07-28 NOTE — TELEPHONE ENCOUNTER
GQSTUO:[id number]    Status:Approved    Review Type:Prior Auth    Coverage Start Date:06/16/2020    Coverage End Date:07/16/2021    Electronically signed by Yola Miller MA on 7/28/20 at 3:28 PM EDT

## 2021-02-04 DIAGNOSIS — E03.9 HYPOTHYROIDISM, UNSPECIFIED TYPE: ICD-10-CM

## 2021-02-04 RX ORDER — LEVOTHYROXINE SODIUM 0.05 MG/1
TABLET ORAL
Qty: 30 TABLET | Refills: 0 | Status: SHIPPED
Start: 2021-02-04 | End: 2021-04-09 | Stop reason: SDUPTHER

## 2021-03-05 ENCOUNTER — TELEPHONE (OUTPATIENT)
Dept: FAMILY MEDICINE CLINIC | Age: 51
End: 2021-03-05

## 2021-03-05 NOTE — TELEPHONE ENCOUNTER
Pts mom came into the office today regarding the covid vaccine, she said because of patients conditions she wants her to get a covid vaccine and she asked if we can write a letter so she can give it to [facility] stating pt has medical conditions that would require her to get the vaccine.

## 2021-03-29 ENCOUNTER — IMMUNIZATION (OUTPATIENT)
Dept: PRIMARY CARE CLINIC | Age: 51
End: 2021-03-29
Payer: MEDICARE

## 2021-03-29 PROCEDURE — 91301 COVID-19, MODERNA VACCINE 100MCG/0.5ML DOSE: CPT | Performed by: FAMILY MEDICINE

## 2021-03-29 PROCEDURE — 0011A COVID-19, MODERNA VACCINE 100MCG/0.5ML DOSE: CPT | Performed by: FAMILY MEDICINE

## 2021-04-09 ENCOUNTER — VIRTUAL VISIT (OUTPATIENT)
Dept: FAMILY MEDICINE CLINIC | Age: 51
End: 2021-04-09
Payer: COMMERCIAL

## 2021-04-09 DIAGNOSIS — Q04.4 SEPTO-OPTIC DYSPLASIA (HCC): ICD-10-CM

## 2021-04-09 DIAGNOSIS — E27.1 ADDISON DISEASE (HCC): Primary | ICD-10-CM

## 2021-04-09 DIAGNOSIS — E23.0 PANHYPOPITUITARISM (HCC): ICD-10-CM

## 2021-04-09 DIAGNOSIS — E03.9 HYPOTHYROIDISM, UNSPECIFIED TYPE: ICD-10-CM

## 2021-04-09 PROCEDURE — 99442 PR PHYS/QHP TELEPHONE EVALUATION 11-20 MIN: CPT | Performed by: FAMILY MEDICINE

## 2021-04-09 RX ORDER — GLUCOSAMINE HCL/CHONDROITIN SU 500-400 MG
CAPSULE ORAL
Qty: 100 STRIP | Refills: 3 | Status: SHIPPED | OUTPATIENT
Start: 2021-04-09

## 2021-04-09 RX ORDER — LANCETS 30 GAUGE
1 EACH MISCELLANEOUS DAILY
Qty: 100 EACH | Refills: 5 | Status: SHIPPED | OUTPATIENT
Start: 2021-04-09

## 2021-04-09 RX ORDER — MULTIVIT-MIN/IRON/FOLIC ACID/K 18-600-40
CAPSULE ORAL
Qty: 90 CAPSULE | Refills: 1 | Status: SHIPPED
Start: 2021-04-09 | End: 2022-01-11

## 2021-04-09 RX ORDER — HYDROCORTISONE 10 MG/1
TABLET ORAL
Qty: 90 TABLET | Refills: 1 | Status: SHIPPED
Start: 2021-04-09 | End: 2021-05-24 | Stop reason: SDUPTHER

## 2021-04-09 RX ORDER — BLOOD-GLUCOSE METER
1 KIT MISCELLANEOUS DAILY
Qty: 1 KIT | Refills: 0 | Status: SHIPPED | OUTPATIENT
Start: 2021-04-09

## 2021-04-09 RX ORDER — LEVOTHYROXINE SODIUM 0.05 MG/1
TABLET ORAL
Qty: 90 TABLET | Refills: 1 | Status: SHIPPED
Start: 2021-04-09 | End: 2021-05-24 | Stop reason: SDUPTHER

## 2021-04-09 RX ORDER — CHLORHEXIDINE GLUCONATE 0.12 MG/ML
RINSE ORAL
Qty: 473 ML | Refills: 1 | Status: SHIPPED | OUTPATIENT
Start: 2021-04-09

## 2021-04-15 PROBLEM — E27.1 ADDISON DISEASE (HCC): Status: ACTIVE | Noted: 2021-04-15

## 2021-04-15 ASSESSMENT — ENCOUNTER SYMPTOMS
VOMITING: 1
DIARRHEA: 0
ANAL BLEEDING: 0
BACK PAIN: 0
PHOTOPHOBIA: 0
SHORTNESS OF BREATH: 0
ALLERGIC/IMMUNOLOGIC NEGATIVE: 1
COUGH: 0
ABDOMINAL DISTENTION: 0
CONSTIPATION: 0
STRIDOR: 0
RECTAL PAIN: 0
EYE DISCHARGE: 0
CHOKING: 0
NAUSEA: 1
VOICE CHANGE: 0
TROUBLE SWALLOWING: 0
WHEEZING: 0
SINUS PRESSURE: 0
SORE THROAT: 0
ABDOMINAL PAIN: 0
COLOR CHANGE: 0
SINUS PAIN: 0
EYE REDNESS: 0
EYE ITCHING: 0
RESPIRATORY NEGATIVE: 1
CHEST TIGHTNESS: 0
FACIAL SWELLING: 0
RHINORRHEA: 0
EYE PAIN: 0
BLOOD IN STOOL: 0

## 2021-04-16 NOTE — PROGRESS NOTES
urine volume, difficulty urinating, dysuria, flank pain, frequency, genital sores, hematuria, menstrual problem, pelvic pain and urgency. Musculoskeletal: Negative. Negative for arthralgias, back pain, gait problem, joint swelling, myalgias, neck pain and neck stiffness. Skin: Negative. Negative for color change, pallor, rash and wound. Allergic/Immunologic: Negative. Neurological: Positive for seizures. Negative for dizziness, tremors, syncope, facial asymmetry, speech difficulty, weakness, light-headedness, numbness and headaches. Hematological: Negative. Negative for adenopathy. Does not bruise/bleed easily. Psychiatric/Behavioral: Negative for agitation, behavioral problems, confusion, decreased concentration, dysphoric mood, hallucinations, self-injury, sleep disturbance and suicidal ideas. The patient is nervous/anxious. The patient is not hyperactive.          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     Sleep apnea      Past Surgical History:   Procedure Laterality Date    DENTAL SURGERY N/A 3/11/2020    DENTAL EXTRACTIONS, ALVEOLOPLASTY performed by Pooja Moscoso DDS at [facility] SKIN BIOPSY  2009    TONSILLECTOMY  1978       Past Family Hx:  Reviewed with patient      Problem Relation Age of Onset    Heart Disease Father         unknown age and type    Other Mother         rheumatic fever     Cancer Maternal Grandmother         leukemia     No Known Problems Sister     No Known Problems Sister        Social Hx:  Reviewed with patient  Social History     Tobacco Use    Smoking status: Never Smoker    Smokeless tobacco: Never Used   Substance Use Topics    Alcohol use: No       OBJECTIVE  LMP 09/27/2011 (Exact Date)     Problem List:  [facility] does not have any pertinent problems on file. PHYS EX:  This was a phone visit    ASSESSMENT/PLAN  Arline Rangel was seen today for seizures and other. 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 Dispense sufficient amount for indicated testing frequency plus additional to accommodate PRN testing needs. -     Carolina Mina MD, Endocrinology, [facility]    Hypothyroidism, unspecified type  -     levothyroxine (SYNTHROID) 50 MCG tablet; Take 1 tablet by mouth once daily  -     chlorhexidine (PERIDEX) 0.12 % solution; USE 15 ML BY MOUTH TWICE DAILY AS DIRECTED  -     glucose monitoring kit (FREESTYLE) monitoring kit; 1 kit by Does not apply route daily  -     Lancets MISC; 1 each by Does not apply route daily Patient is not insulin dependent, diagnosis E11.65  -     blood glucose monitor strips; Test 1 times a day & as needed for symptoms of irregular blood glucose. Dispense sufficient amount for indicated testing frequency plus additional to accommodate PRN testing needs.   -     Carolina Mina MD, Endocrinology, [place]    Panhypopituitarism [facility])  -     hydrocortisone (CORTEF) 10 MG tablet; TAKE ONE TABLET BY MOUTH ONCE DAILY  -     Cholecalciferol (VITAMIN D) 50 MCG (2000 UT) CAPS capsule; TAKE 1 CAPSULE BY MOUTH ONCE DAILY  -     chlorhexidine (PERIDEX) 0.12 % solution; USE 15 ML BY MOUTH TWICE DAILY AS DIRECTED  -     glucose monitoring kit (FREESTYLE) monitoring kit; 1 kit by Does not apply route daily  -     Lancets MISC; 1 each by Does not apply route daily Patient is not insulin dependent, diagnosis E11.65  -     blood glucose monitor strips; Test 1 times a day & as needed for symptoms of irregular blood glucose. Dispense sufficient amount for indicated testing frequency plus additional to accommodate PRN testing needs. 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 Dispense sufficient amount for indicated testing frequency plus additional to accommodate PRN testing needs. -     Jeannie Steen MD, Endocrinology, [place]    Pt instructed if any worse go ED ASAP. TELEPHONE VISIT     Gaurav Dominguez is a 53 y.o. female evaluated via telephone on 4/9/2021. Consent:  She and/or health care decision maker is aware that that she may receive a bill for this telephone service, depending on her insurance coverage, and has provided verbal consent to proceed: Yes    Documentation:  Patient identification was verified at the start of the visit: Yes  I communicated with the patient and/or health care decision maker about as above  Details of this discussion including any medical advice provided: medication refills sent to pharmacy, endocrinology set up. I affirm this is a Patient Initiated Episode with a Patient who has not had a related appointment within my department in the past 7 days or scheduled within the next 24 hours.         Patient's location: home address in [address] [place] [address]   Physician  location other address in [facility]   Other people involved in call Pts mother          Total Time: minutes: 21-30 minutes        The visit was conducted pursuant to the Bottle 1     No facility-administered encounter medications on file as of 4/9/2021. No follow-ups on file.         Reviewed recent labs related to [facility] current problems      Discussed importance of regular Health Maintenance follow up  Health Maintenance   Topic    Hepatitis C screen     HIV screen     DTaP/Tdap/Td vaccine (1 - Tdap)    Cervical cancer screen     A1C test (Diabetic or Prediabetic)     TSH testing     COVID-19 Vaccine (2 - Moderna 2-dose series)    Flu vaccine (Season Ended)    Lipid screen     Hepatitis A vaccine     Hepatitis B vaccine     Hib vaccine     Meningococcal (ACWY) vaccine     Pneumococcal 0-64 years Vaccine

## 2021-05-24 ENCOUNTER — OFFICE VISIT (OUTPATIENT)
Dept: ENDOCRINOLOGY | Age: 51
End: 2021-05-24
Payer: MEDICARE

## 2021-05-24 ENCOUNTER — TELEPHONE (OUTPATIENT)
Dept: FAMILY MEDICINE CLINIC | Age: 51
End: 2021-05-24

## 2021-05-24 VITALS
WEIGHT: 148 LBS | BODY MASS INDEX: 31.07 KG/M2 | OXYGEN SATURATION: 98 % | RESPIRATION RATE: 16 BRPM | HEIGHT: 58 IN | HEART RATE: 68 BPM

## 2021-05-24 DIAGNOSIS — E03.9 HYPOTHYROIDISM, UNSPECIFIED TYPE: ICD-10-CM

## 2021-05-24 DIAGNOSIS — Z01.84 COVID-19 VIRUS IGG ANTIBODY TEST RESULT UNKNOWN: Primary | ICD-10-CM

## 2021-05-24 DIAGNOSIS — R73.03 PREDIABETES: ICD-10-CM

## 2021-05-24 DIAGNOSIS — E23.0 PANHYPOPITUITARISM (HCC): ICD-10-CM

## 2021-05-24 DIAGNOSIS — E23.0 HYPOPITUITARISM (HCC): ICD-10-CM

## 2021-05-24 DIAGNOSIS — E27.49 SECONDARY ADRENAL INSUFFICIENCY (HCC): Primary | ICD-10-CM

## 2021-05-24 PROCEDURE — G8427 DOCREV CUR MEDS BY ELIG CLIN: HCPCS | Performed by: INTERNAL MEDICINE

## 2021-05-24 PROCEDURE — 1036F TOBACCO NON-USER: CPT | Performed by: INTERNAL MEDICINE

## 2021-05-24 PROCEDURE — 99204 OFFICE O/P NEW MOD 45 MIN: CPT | Performed by: INTERNAL MEDICINE

## 2021-05-24 PROCEDURE — G8417 CALC BMI ABV UP PARAM F/U: HCPCS | Performed by: INTERNAL MEDICINE

## 2021-05-24 RX ORDER — LEVOTHYROXINE SODIUM 0.05 MG/1
TABLET ORAL
Qty: 90 TABLET | Refills: 5 | Status: SHIPPED
Start: 2021-05-24 | End: 2022-03-23 | Stop reason: SDUPTHER

## 2021-05-24 RX ORDER — PHENOL 1.4 %
1 AEROSOL, SPRAY (ML) MUCOUS MEMBRANE DAILY
COMMUNITY

## 2021-05-24 RX ORDER — HYDROCORTISONE 10 MG/1
TABLET ORAL
Qty: 145 TABLET | Refills: 3 | Status: SHIPPED
Start: 2021-05-24 | End: 2022-03-23 | Stop reason: SDUPTHER

## 2021-05-24 NOTE — TELEPHONE ENCOUNTER
Pts mom came into the office pt is going to have blood work tomorrow and they wanted to add covid antibody test.

## 2021-05-24 NOTE — PROGRESS NOTES
CURRENT MEDICATIONS   Current Outpatient Medications   Medication Sig Dispense Refill    calcium carbonate 600 MG TABS tablet Take 1 tablet by mouth daily      levothyroxine (SYNTHROID) 50 MCG tablet Take 1 tablet by mouth once daily 90 tablet 1    hydrocortisone (CORTEF) 10 MG tablet TAKE ONE TABLET BY MOUTH ONCE DAILY 90 tablet 1    Cholecalciferol (VITAMIN D) 50 MCG (2000 UT) CAPS capsule TAKE 1 CAPSULE BY MOUTH ONCE DAILY 90 capsule 1    chlorhexidine (PERIDEX) 0.12 % solution USE 15 ML BY MOUTH TWICE DAILY AS DIRECTED 473 mL 1    glucose monitoring kit (FREESTYLE) monitoring kit 1 kit by Does not apply route daily 1 kit 0    Lancets MISC 1 each by Does not apply route daily Patient is not insulin dependent, diagnosis E11.65 100 each 5    Omega-3 Fatty Acids (FISH OIL) 1200 MG CAPS Take 2 capsules by mouth once daily 180 capsule 0    blood glucose monitor strips Test 1 times a day & as needed for symptoms of irregular blood glucose. Dispense sufficient amount for indicated testing frequency plus additional to accommodate PRN testing needs. 100 strip 3     No current facility-administered medications for this visit. Review of Systems  Constitutional: No fever, no chills, no diaphoresis, no generalized weakness. HEENT: No blurred vision, No sore throat, no ear pain, no hair loss  Neck: denied any neck swelling, difficulty swallowing,   Cadrdiopulomary: No CP, SOB or palpitation, No orthopnea or PND. No cough or wheezing. GI: No N/V/D, no constipation, No abdominal pain, no melena or hematochezia   : Denied any dysuria, hematuria, flank pain, discharge, or incontinence. Skin: denied any rash, ulcer, Hirsute, or hyperpigmentation. MSK: denied any joint deformity, joint pain/swelling, muscle pain, or back pain.   Neuro: no numbess, no tingling, no weakness,     OBJECTIVE    Pulse 68   Resp 16   Ht 4' 10\" (1.473 m)   Wt 148 lb (67.1 kg)   LMP 09/27/2011 (Exact Date)   SpO2 98%   BMI 30.93 kg/m²   BP Readings from Last 4 Encounters:   03/11/20 (!) 147/72   03/11/20 (!) 152/79   02/13/20 132/76   01/21/20 (!) 176/100     Wt Readings from Last 6 Encounters:   05/24/21 148 lb (67.1 kg)   03/11/20 158 lb (71.7 kg)   02/13/20 158 lb (71.7 kg)   01/21/20 156 lb (70.8 kg)   07/19/19 148 lb 12.8 oz (67.5 kg)   07/10/19 145 lb (65.8 kg)       Physical examination:  General: awake alert, oriented x3, no abnormal position or movements. HEENT: normocephalic non traumatic  Neck: supple, no LN enlargement, no thyromegaly, no thyroid tenderness, no JVD. Pulm: Clear equal air entry no added sounds, no wheezing or rhonchi    CVS: S1 + S2, no murmur, no heave. Dorsalis pedis pulse palpable   Abd: soft lax, no tenderness, no organomegaly, audible bowel sounds. Skin: warm, no lesions, no rash.   Musculoskeletal: No back tenderness, no kyphosis/scoliosis   Neuro: CN intact, sensation normal , muscle power normal.  Psych: normal mood, and affect    Review of Laboratory Data:  I have reviewed the following:  Lab Results   Component Value Date/Time    WBC 8.1 02/15/2019 12:00 PM    RBC 5.11 02/15/2019 12:00 PM    HGB 14.5 02/15/2019 12:00 PM    HCT 44.4 02/15/2019 12:00 PM    MCV 86.9 02/15/2019 12:00 PM    MCH 28.4 02/15/2019 12:00 PM    MCHC 32.7 02/15/2019 12:00 PM    RDW 12.6 02/15/2019 12:00 PM     02/15/2019 12:00 PM    MPV 12.4 (H) 02/15/2019 12:00 PM      Lab Results   Component Value Date/Time     02/15/2019 12:00 PM    K 4.5 02/15/2019 12:00 PM    CO2 27 02/15/2019 12:00 PM    BUN 13 02/15/2019 12:00 PM    CREATININE 0.9 02/15/2019 12:00 PM    CALCIUM 9.7 02/15/2019 12:00 PM    LABGLOM >60 02/15/2019 12:00 PM    GFRAA >60 02/15/2019 12:00 PM      Lab Results   Component Value Date/Time    TSH 1.290 02/15/2019 12:00 PM    T4FREE 1.13 09/14/2016 10:57 AM     Lab Results   Component Value Date    LABA1C 6.3 02/15/2019    GLUCOSE 121 02/15/2019     Lab Results   Component Value Date    TRIG 118 02/15/2019    HDL 54 02/15/2019    LDLCALC 167 02/15/2019    CHOL 245 02/15/2019     Lab Results   Component Value Date    VITD25 40 12/07/2016    VITD25 42 09/14/2016       ASSESSMENT & RECOMMENDATIONS   Patrick Martinez, a 53 y.o.-old female seen in for following issues     Panhypopituitarism   A. Central hypothyroidism   ? Currently on Levothyroxine 50 mcg daily   ? Check Free T4 and adjust the dose if needed   ? Goal to keep Free T4 in mid-upper limit of normal   ? Only free T4 should be used to adjust her thyroid medications    B. Secondary Adrenal Insufficiency   ? Advised to take Hydrocortisone 10 mg in am and 5 mg in the afternoon   ? Patient need to be on at least maintenance dose of steroid all the time and stress dose for any stressful events. ? Discussed the importance of wearing medical alert identification (bracelet, necklace)  ? Discussed to need to increase steroid dose by 2-3 times for illness   ? If vomiting pt understand the need to go to hospital for iv treatment      C. Hypogonadotropic hypogonadism   ? Pt currently on E2 supplement and not interested in this   ? Check VitD, Ca   ? Will obtain DEXA scan at next OV     · No evidence of DI     I personally reviewed external notes from PCP and other patient's care team providers, and personally interpreted labs associated with the above diagnosis. I also ordered labs to further assess and manage the above addressed medical conditions. Return in about 4 months (around 9/24/2021) for hypothyroidism, hypopituitarism . The above issues were reviewed with the patient who understood and agreed with the plan. Thank you for allowing us to participate in the care of this patient. Please do not hesitate to contact us with any additional questions. Diagnosis Orders   1. Secondary adrenal insufficiency (HCC)  Comprehensive Metabolic Panel   2.  Hypopituitarism ([facility]  TSH without Reflex    T4, Free    Vitamin D 25 Hydroxy    ALDOSTERONE    RENIN

## 2021-05-24 NOTE — TELEPHONE ENCOUNTER
Patient left a  on clinical care line asking for test for antibodies. I called her back for clarification, she states that Dr Torsten Martinez told her that he was curious if she has antibodies since she had the covid vaccine. She wants tested.

## 2021-05-25 ENCOUNTER — TELEPHONE (OUTPATIENT)
Dept: ENDOCRINOLOGY | Age: 51
End: 2021-05-25

## 2021-05-25 ENCOUNTER — HOSPITAL ENCOUNTER (OUTPATIENT)
Age: 51
Discharge: HOME | End: 2021-05-25
Payer: MEDICARE

## 2021-05-25 DIAGNOSIS — E23.0 HYPOPITUITARISM (HCC): ICD-10-CM

## 2021-05-25 DIAGNOSIS — R73.03 PREDIABETES: ICD-10-CM

## 2021-05-25 DIAGNOSIS — E27.49 SECONDARY ADRENAL INSUFFICIENCY (HCC): ICD-10-CM

## 2021-05-25 LAB
ALBUMIN SERPL-MCNC: 4.8 G/DL (ref 3.5–5.2)
ALP BLD-CCNC: 63 U/L (ref 35–104)
ALT SERPL-CCNC: 15 U/L (ref 0–32)
ANION GAP SERPL CALCULATED.3IONS-SCNC: 21 MMOL/L (ref 7–16)
AST SERPL-CCNC: 25 U/L (ref 0–31)
BILIRUB SERPL-MCNC: 0.4 MG/DL (ref 0–1.2)
BUN BLDV-MCNC: 12 MG/DL (ref 6–20)
CALCIUM SERPL-MCNC: 10.2 MG/DL (ref 8.6–10.2)
CHLORIDE BLD-SCNC: 98 MMOL/L (ref 98–107)
CO2: 18 MMOL/L (ref 22–29)
CREAT SERPL-MCNC: 1 MG/DL (ref 0.5–1)
GFR AFRICAN AMERICAN: >60
GFR NON-AFRICAN AMERICAN: 59 ML/MIN/1.73
GLUCOSE BLD-MCNC: 110 MG/DL (ref 74–99)
POTASSIUM SERPL-SCNC: 4 MMOL/L (ref 3.5–5)
SODIUM BLD-SCNC: 137 MMOL/L (ref 132–146)
T4 FREE: 1.13 NG/DL (ref 0.93–1.7)
TOTAL PROTEIN: 7.4 G/DL (ref 6.4–8.3)
TSH SERPL DL<=0.05 MIU/L-ACNC: 1.17 UIU/ML (ref 0.27–4.2)
VITAMIN D 25-HYDROXY: 55 NG/ML (ref 30–100)

## 2021-05-25 PROCEDURE — 84439 ASSAY OF FREE THYROXINE: CPT

## 2021-05-25 PROCEDURE — 80053 COMPREHEN METABOLIC PANEL: CPT

## 2021-05-25 PROCEDURE — 83516 IMMUNOASSAY NONANTIBODY: CPT

## 2021-05-25 PROCEDURE — 84244 ASSAY OF RENIN: CPT

## 2021-05-25 PROCEDURE — 84443 ASSAY THYROID STIM HORMONE: CPT

## 2021-05-25 PROCEDURE — 82088 ASSAY OF ALDOSTERONE: CPT

## 2021-05-25 PROCEDURE — 82306 VITAMIN D 25 HYDROXY: CPT

## 2021-05-25 PROCEDURE — 36415 COLL VENOUS BLD VENIPUNCTURE: CPT

## 2021-05-25 PROCEDURE — 84681 ASSAY OF C-PEPTIDE: CPT

## 2021-05-25 NOTE — TELEPHONE ENCOUNTER
Thyroid hormones and vitd are very good    Still waiting for other results    Taking medications shouldn't affect your result

## 2021-05-26 ENCOUNTER — HOSPITAL ENCOUNTER (OUTPATIENT)
Age: 51
Discharge: HOME | End: 2021-05-26
Payer: MEDICARE

## 2021-05-26 DIAGNOSIS — Z01.84 COVID-19 VIRUS IGG ANTIBODY TEST RESULT UNKNOWN: ICD-10-CM

## 2021-05-26 PROCEDURE — 86769 SARS-COV-2 COVID-19 ANTIBODY: CPT

## 2021-05-26 PROCEDURE — 36415 COLL VENOUS BLD VENIPUNCTURE: CPT

## 2021-05-27 LAB
C-PEPTIDE: 1.2 NG/ML (ref 0.8–3.5)
SARS-COV-2 ANTIBODY, TOTAL: NORMAL

## 2021-05-28 ENCOUNTER — TELEPHONE (OUTPATIENT)
Dept: ENDOCRINOLOGY | Age: 51
End: 2021-05-28

## 2021-05-28 LAB
ALDOSTERONE: 9.2 NG/DL
GLUTAMIC ACID DECARB AB: <5 IU/ML (ref 0–5)
RENIN ACTIVITY: 1 NG/ML/HR

## 2021-05-28 NOTE — TELEPHONE ENCOUNTER
Called pt and discussed her results    All results are very good.  Will continue current thyroid and HC regimen

## 2021-12-13 DIAGNOSIS — E23.0 PANHYPOPITUITARISM (HCC): ICD-10-CM

## 2021-12-14 RX ORDER — MULTIVIT-MIN/IRON/FOLIC ACID/K 18-600-40
CAPSULE ORAL
Qty: 90 CAPSULE | Refills: 0 | OUTPATIENT
Start: 2021-12-14

## 2022-01-11 DIAGNOSIS — E23.0 PANHYPOPITUITARISM (HCC): ICD-10-CM

## 2022-01-12 RX ORDER — MULTIVIT-MIN/IRON/FOLIC ACID/K 18-600-40
CAPSULE ORAL
Qty: 30 CAPSULE | Refills: 0 | Status: SHIPPED
Start: 2022-01-12 | End: 2022-02-11

## 2022-02-08 DIAGNOSIS — E23.0 PANHYPOPITUITARISM (HCC): ICD-10-CM

## 2022-02-11 RX ORDER — ACETAMINOPHEN 160 MG
TABLET,DISINTEGRATING ORAL
Qty: 30 CAPSULE | Refills: 0 | Status: SHIPPED
Start: 2022-02-11 | End: 2022-03-16

## 2022-03-15 DIAGNOSIS — E23.0 PANHYPOPITUITARISM (HCC): ICD-10-CM

## 2022-03-16 RX ORDER — ACETAMINOPHEN 160 MG
TABLET,DISINTEGRATING ORAL
Qty: 30 CAPSULE | Refills: 0 | Status: SHIPPED
Start: 2022-03-16 | End: 2022-03-23 | Stop reason: SDUPTHER

## 2022-03-23 ENCOUNTER — TELEMEDICINE (OUTPATIENT)
Dept: FAMILY MEDICINE CLINIC | Age: 52
End: 2022-03-23
Payer: MEDICARE

## 2022-03-23 ENCOUNTER — TELEPHONE (OUTPATIENT)
Dept: FAMILY MEDICINE CLINIC | Age: 52
End: 2022-03-23

## 2022-03-23 ENCOUNTER — TELEPHONE (OUTPATIENT)
Dept: ENDOCRINOLOGY | Age: 52
End: 2022-03-23

## 2022-03-23 DIAGNOSIS — E27.1 ADDISON DISEASE (HCC): ICD-10-CM

## 2022-03-23 DIAGNOSIS — Q04.4 SEPTO-OPTIC DYSPLASIA (HCC): ICD-10-CM

## 2022-03-23 DIAGNOSIS — I10 HYPERTENSION, UNSPECIFIED TYPE: ICD-10-CM

## 2022-03-23 DIAGNOSIS — J01.90 ACUTE BACTERIAL SINUSITIS: Primary | ICD-10-CM

## 2022-03-23 DIAGNOSIS — E23.0 PANHYPOPITUITARISM (HCC): ICD-10-CM

## 2022-03-23 DIAGNOSIS — E03.9 HYPOTHYROIDISM, UNSPECIFIED TYPE: ICD-10-CM

## 2022-03-23 DIAGNOSIS — B96.89 ACUTE BACTERIAL SINUSITIS: Primary | ICD-10-CM

## 2022-03-23 PROCEDURE — 99442 PR PHYS/QHP TELEPHONE EVALUATION 11-20 MIN: CPT | Performed by: FAMILY MEDICINE

## 2022-03-23 RX ORDER — CEFDINIR 300 MG/1
300 CAPSULE ORAL 2 TIMES DAILY
Qty: 14 CAPSULE | Refills: 0 | Status: SHIPPED | OUTPATIENT
Start: 2022-03-23 | End: 2022-03-30

## 2022-03-23 RX ORDER — HYDROCORTISONE 10 MG/1
TABLET ORAL
Qty: 145 TABLET | Refills: 1 | Status: SHIPPED
Start: 2022-03-23 | End: 2022-10-01 | Stop reason: SDUPTHER

## 2022-03-23 RX ORDER — LEVOTHYROXINE SODIUM 0.05 MG/1
TABLET ORAL
Qty: 90 TABLET | Refills: 1 | Status: SHIPPED
Start: 2022-03-23 | End: 2022-10-01 | Stop reason: SDUPTHER

## 2022-03-23 RX ORDER — ACETAMINOPHEN 160 MG
TABLET,DISINTEGRATING ORAL
Qty: 90 CAPSULE | Refills: 1 | Status: SHIPPED
Start: 2022-03-23 | End: 2022-10-01 | Stop reason: SDUPTHER

## 2022-03-23 RX ORDER — HYDROCHLOROTHIAZIDE 25 MG/1
25 TABLET ORAL EVERY MORNING
Qty: 30 TABLET | Refills: 5 | Status: SHIPPED
Start: 2022-03-23 | End: 2022-10-03

## 2022-03-23 SDOH — ECONOMIC STABILITY: FOOD INSECURITY: WITHIN THE PAST 12 MONTHS, THE FOOD YOU BOUGHT JUST DIDN'T LAST AND YOU DIDN'T HAVE MONEY TO GET MORE.: NEVER TRUE

## 2022-03-23 SDOH — ECONOMIC STABILITY: FOOD INSECURITY: WITHIN THE PAST 12 MONTHS, YOU WORRIED THAT YOUR FOOD WOULD RUN OUT BEFORE YOU GOT MONEY TO BUY MORE.: NEVER TRUE

## 2022-03-23 ASSESSMENT — PATIENT HEALTH QUESTIONNAIRE - PHQ9
1. LITTLE INTEREST OR PLEASURE IN DOING THINGS: 0
SUM OF ALL RESPONSES TO PHQ QUESTIONS 1-9: 0
2. FEELING DOWN, DEPRESSED OR HOPELESS: 0
SUM OF ALL RESPONSES TO PHQ QUESTIONS 1-9: 0
SUM OF ALL RESPONSES TO PHQ QUESTIONS 1-9: 0
SUM OF ALL RESPONSES TO PHQ9 QUESTIONS 1 & 2: 0
SUM OF ALL RESPONSES TO PHQ QUESTIONS 1-9: 0

## 2022-03-23 ASSESSMENT — SOCIAL DETERMINANTS OF HEALTH (SDOH): HOW HARD IS IT FOR YOU TO PAY FOR THE VERY BASICS LIKE FOOD, HOUSING, MEDICAL CARE, AND HEATING?: NOT HARD AT ALL

## 2022-03-23 NOTE — TELEPHONE ENCOUNTER
Per Dr. Ngoc Aleman patient is taking otc cough medication. This MA spoke with patient and she stated yes she is taking otc meds and does not need anything called in at this time.   Electronically signed by Sana Acosta MA on 3/23/22 at 3:37 PM EDT

## 2022-03-23 NOTE — TELEPHONE ENCOUNTER
Patient asking if you're gong to prescribe anything for her cough?   Last seen 3/23/2022  Next appt Visit date not found  Maricruz Dumont

## 2022-03-23 NOTE — TELEPHONE ENCOUNTER
Pt has cold. Spoke to Dr Jovel on the phone visit today. Has been taking day-quil 1 dose in the am then the tussin about 3.5 hours later then every 4 hours until bedtime, took night-quil around 8 pm, took tussin 4 hours later if woke up from coughing during sleep. Also taking one 10mg tablet of hydrocortisone in the morning and 0.5 tablet (5mg) every 6 hours. Blood pressure is 180/125. Pt states never this high. Negative for Covid 3/19/2022. PCP is going to prescribe other medication to take instead of the tussin and day & night quil as her BP is very high. Told pt to double her her hydrocortisone (2 tablets in the am & 1 tablet at 3 pm) as she was told to do when she is sick. Do you have anything else to recommend?

## 2022-03-24 ENCOUNTER — TELEPHONE (OUTPATIENT)
Dept: FAMILY MEDICINE CLINIC | Age: 52
End: 2022-03-24

## 2022-03-24 NOTE — TELEPHONE ENCOUNTER
This MA received call from pt's mother regarding ongoing issue with htn. Per pt's mother, pt's BP are still elevated after starting HCTZ. Pt's mother denies pt having chest pain, jaw pain, arm pain, dizziness, nausea, or sweats. Pt scheduled for visit tomorrow 3/25. Pt's mother advised to continue to monitor pt's BP and bring readings to office tomorrow as well as BP cuff. Pt's mother verbalized she understood.       Electronically signed by Ray Henriquez MA on 3/24/22 at 1:31 PM EDT

## 2022-03-25 ENCOUNTER — OFFICE VISIT (OUTPATIENT)
Dept: FAMILY MEDICINE CLINIC | Age: 52
End: 2022-03-25
Payer: MEDICARE

## 2022-03-25 VITALS
TEMPERATURE: 98.5 F | OXYGEN SATURATION: 98 % | HEART RATE: 71 BPM | WEIGHT: 138 LBS | SYSTOLIC BLOOD PRESSURE: 138 MMHG | RESPIRATION RATE: 16 BRPM | DIASTOLIC BLOOD PRESSURE: 84 MMHG | BODY MASS INDEX: 28.97 KG/M2 | HEIGHT: 58 IN

## 2022-03-25 DIAGNOSIS — E23.0 PANHYPOPITUITARISM (HCC): ICD-10-CM

## 2022-03-25 DIAGNOSIS — I10 PRIMARY HYPERTENSION: ICD-10-CM

## 2022-03-25 DIAGNOSIS — E03.8 OTHER SPECIFIED HYPOTHYROIDISM: ICD-10-CM

## 2022-03-25 DIAGNOSIS — E27.1 ADDISON DISEASE (HCC): ICD-10-CM

## 2022-03-25 DIAGNOSIS — R73.01 IFG (IMPAIRED FASTING GLUCOSE): ICD-10-CM

## 2022-03-25 DIAGNOSIS — R53.83 FATIGUE, UNSPECIFIED TYPE: ICD-10-CM

## 2022-03-25 DIAGNOSIS — Z00.00 INITIAL MEDICARE ANNUAL WELLNESS VISIT: ICD-10-CM

## 2022-03-25 DIAGNOSIS — R05.9 COUGH: ICD-10-CM

## 2022-03-25 DIAGNOSIS — Z00.00 ENCOUNTER FOR MEDICARE ANNUAL WELLNESS EXAM: Primary | ICD-10-CM

## 2022-03-25 DIAGNOSIS — E78.2 MIXED HYPERLIPIDEMIA: ICD-10-CM

## 2022-03-25 DIAGNOSIS — Z12.11 SCREEN FOR COLON CANCER: ICD-10-CM

## 2022-03-25 PROBLEM — B96.89 ACUTE BACTERIAL SINUSITIS: Status: ACTIVE | Noted: 2022-03-25

## 2022-03-25 PROBLEM — J01.90 ACUTE BACTERIAL SINUSITIS: Status: ACTIVE | Noted: 2022-03-25

## 2022-03-25 LAB
ALBUMIN SERPL-MCNC: 5.1 G/DL (ref 3.5–5.2)
ALP BLD-CCNC: 65 U/L (ref 35–104)
ALT SERPL-CCNC: 6 U/L (ref 0–32)
ANION GAP SERPL CALCULATED.3IONS-SCNC: 19 MMOL/L (ref 7–16)
AST SERPL-CCNC: 25 U/L (ref 0–31)
BASOPHILS ABSOLUTE: 0.08 E9/L (ref 0–0.2)
BASOPHILS RELATIVE PERCENT: 0.8 % (ref 0–2)
BILIRUB SERPL-MCNC: 0.6 MG/DL (ref 0–1.2)
BUN BLDV-MCNC: 16 MG/DL (ref 6–20)
CALCIUM SERPL-MCNC: 10.5 MG/DL (ref 8.6–10.2)
CHLORIDE BLD-SCNC: 86 MMOL/L (ref 98–107)
CHOLESTEROL, TOTAL: 318 MG/DL (ref 0–199)
CO2: 23 MMOL/L (ref 22–29)
CREAT SERPL-MCNC: 1 MG/DL (ref 0.5–1)
EOSINOPHILS ABSOLUTE: 0.08 E9/L (ref 0.05–0.5)
EOSINOPHILS RELATIVE PERCENT: 0.8 % (ref 0–6)
GFR AFRICAN AMERICAN: >60
GFR NON-AFRICAN AMERICAN: 59 ML/MIN/1.73
GLUCOSE BLD-MCNC: 93 MG/DL (ref 74–99)
HBA1C MFR BLD: 5.5 % (ref 4–5.6)
HCT VFR BLD CALC: 45.2 % (ref 34–48)
HDLC SERPL-MCNC: 68 MG/DL
HEMOGLOBIN: 15.2 G/DL (ref 11.5–15.5)
IMMATURE GRANULOCYTES #: 0.07 E9/L
IMMATURE GRANULOCYTES %: 0.7 % (ref 0–5)
LDL CHOLESTEROL CALCULATED: 227 MG/DL (ref 0–99)
LYMPHOCYTES ABSOLUTE: 3.42 E9/L (ref 1.5–4)
LYMPHOCYTES RELATIVE PERCENT: 34.2 % (ref 20–42)
MCH RBC QN AUTO: 27.9 PG (ref 26–35)
MCHC RBC AUTO-ENTMCNC: 33.6 % (ref 32–34.5)
MCV RBC AUTO: 82.9 FL (ref 80–99.9)
MONOCYTES ABSOLUTE: 0.41 E9/L (ref 0.1–0.95)
MONOCYTES RELATIVE PERCENT: 4.1 % (ref 2–12)
NEUTROPHILS ABSOLUTE: 5.94 E9/L (ref 1.8–7.3)
NEUTROPHILS RELATIVE PERCENT: 59.4 % (ref 43–80)
PDW BLD-RTO: 12.6 FL (ref 11.5–15)
PLATELET # BLD: 273 E9/L (ref 130–450)
PMV BLD AUTO: 12.1 FL (ref 7–12)
POTASSIUM SERPL-SCNC: 5 MMOL/L (ref 3.5–5)
RBC # BLD: 5.45 E12/L (ref 3.5–5.5)
SODIUM BLD-SCNC: 128 MMOL/L (ref 132–146)
TOTAL PROTEIN: 8.5 G/DL (ref 6.4–8.3)
TRIGL SERPL-MCNC: 116 MG/DL (ref 0–149)
TSH SERPL DL<=0.05 MIU/L-ACNC: 0.47 UIU/ML (ref 0.27–4.2)
URIC ACID, SERUM: 4.2 MG/DL (ref 2.4–5.7)
VLDLC SERPL CALC-MCNC: 23 MG/DL
WBC # BLD: 10 E9/L (ref 4.5–11.5)

## 2022-03-25 PROCEDURE — 3017F COLORECTAL CA SCREEN DOC REV: CPT | Performed by: FAMILY MEDICINE

## 2022-03-25 PROCEDURE — G8484 FLU IMMUNIZE NO ADMIN: HCPCS | Performed by: FAMILY MEDICINE

## 2022-03-25 PROCEDURE — G0438 PPPS, INITIAL VISIT: HCPCS | Performed by: FAMILY MEDICINE

## 2022-03-25 RX ORDER — GUAIFENESIN 600 MG/1
600 TABLET, EXTENDED RELEASE ORAL 2 TIMES DAILY
Qty: 30 TABLET | Refills: 0 | Status: SHIPPED | OUTPATIENT
Start: 2022-03-25 | End: 2022-04-09

## 2022-03-25 RX ORDER — AMLODIPINE BESYLATE 5 MG/1
5 TABLET ORAL DAILY
Qty: 90 TABLET | Refills: 1 | Status: SHIPPED
Start: 2022-03-25 | End: 2022-03-28

## 2022-03-25 ASSESSMENT — PATIENT HEALTH QUESTIONNAIRE - PHQ9
SUM OF ALL RESPONSES TO PHQ QUESTIONS 1-9: 0
2. FEELING DOWN, DEPRESSED OR HOPELESS: 0
1. LITTLE INTEREST OR PLEASURE IN DOING THINGS: 0
SUM OF ALL RESPONSES TO PHQ9 QUESTIONS 1 & 2: 0
SUM OF ALL RESPONSES TO PHQ QUESTIONS 1-9: 0

## 2022-03-25 ASSESSMENT — LIFESTYLE VARIABLES: HOW OFTEN DO YOU HAVE A DRINK CONTAINING ALCOHOL: NEVER

## 2022-03-25 NOTE — PATIENT INSTRUCTIONS
Personalized Preventive Plan for Kassy Hauser - 3/25/2022  Medicare offers a range of preventive health benefits. Some of the tests and screenings are paid in full while other may be subject to a deductible, co-insurance, and/or copay. Some of these benefits include a comprehensive review of your medical history including lifestyle, illnesses that may run in your family, and various assessments and screenings as appropriate. After reviewing your medical record and screening and assessments performed today your provider may have ordered immunizations, labs, imaging, and/or referrals for you. A list of these orders (if applicable) as well as your Preventive Care list are included within your After Visit Summary for your review. Other Preventive Recommendations:    · A preventive eye exam performed by an eye specialist is recommended every 1-2 years to screen for glaucoma; cataracts, macular degeneration, and other eye disorders. · A preventive dental visit is recommended every 6 months. · Try to get at least 150 minutes of exercise per week or 10,000 steps per day on a pedometer . · Order or download the FREE \"Exercise & Physical Activity: Your Everyday Guide\" from [employer]. Call 0-273.754.1444 or search [employer] online. · You need 3864-1701 mg of calcium and 5551-7794 IU of vitamin D per day. It is possible to meet your calcium requirement with diet alone, but a vitamin D supplement is usually necessary to meet this goal.  · When exposed to the sun, use a sunscreen that protects against both UVA and UVB radiation with an SPF of 30 or greater. Reapply every 2 to 3 hours or after sweating, drying off with a towel, or swimming. · Always wear a seat belt when traveling in a car. Always wear a helmet when riding a bicycle or motorcycle.

## 2022-03-25 NOTE — PROGRESS NOTES
Enio Louis (:  1970) is a  here for evaluation of the following:  TELEPHONE VISIT     Enio Louis is a 49 y.o. female evaluated via telephone on 3/23/2022. Consent:  She and/or health care decision maker is aware that that she may receive a bill for this telephone service,  which includes applicable co-pays. This virtual visit was conducted with the patient's  (and/or legal guardian's) consent. The visit was conducted pursuant to the emergency declaration under the [facility], [facility] authority and the [employer] Act. Patient identification was verified, and a caregiver was present when appropriate. The patient was located in a state where the provider was licensed to provide care. depending on her insurance coverage, and has provided verbal consent to proceed  Yes    Documentation:  Patient identification was verified at the start of the visit: Yes  I communicated with the patient and/or health care decision maker about THIS [address] for 1 week. Pt is blind, pts mother is on phone call with pt. Pt has hypertension due to dayquil and nightquil, and increase cortsisone. Details of this discussion including any medical advice provided: instructed to stop dayquil, and nightquil recheck BP in afternoon and call with results. HCTZ was started in pm. Medication will be sent to pharmacy. I affirm this is a Patient Initiated Episode with a Patient who has not had a related appointment within my department in the past 7 days or scheduled within the next 24 hours.         Patient's location: home address in [facility]  Physician  location other address in [facility]   Other people involved in call Pts mother          Total Time: minutes: 21-30 minutes        The visit was conducted pursuant to the emergency declaration under the [address] Emergencies Act, [facility] waiver authority and the Coronavirus Preparedness and Response Supplemental Appropriations Act. Patient identification was verified, and a caregiver was present when appropriate. The patient was located in a state where the provider was credentialed to provide care. Note: not billable if this call serves to triage the patient into an appointment for the relevant concern          Jose Ivy, DO                 Assessment & Plan   Below is the assessment and plan developed based on review of pertinent history, physical exam, labs, studies, and medications. 1. Acute bacterial sinusitis  -     cefdinir (OMNICEF) 300 MG capsule; Take 1 capsule by mouth 2 times daily for 7 days, Disp-14 capsule, R-0Normal  2. Panhypopituitarism ([facility] [address]  -     Cholecalciferol (VITAMIN D3) 50 MCG (2000 UT) CAPS; Take 1 capsule by mouth once daily, Disp-90 capsule, R-1Normal  -     hydrocortisone (CORTEF) 10 MG tablet; Take 1 tablet in morning and 0.5 tablet at 3 pm, Disp-145 tablet, R-1Please consider 90 day supplies to promote better adherenceNormal  3. 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 Hypertension, unspecified type  -     hydroCHLOROthiazide (HYDRODIURIL) 25 MG tablet; Take 1 tablet by mouth every morning, Disp-30 tablet, R-5Normal    No follow-ups on file. Subjective   HPI  Review of Systems       Objective   Patient-Reported Vitals  No data recorded     Physical Exam             [facility], was evaluated through a synchronous (real-time) audio-video encounter.  The patient (or guardian if applicable) is aware that this is a billable service, which includes applicable co-pays. This Virtual Visit was conducted with patient's (and/or legal guardian's) consent. The visit was conducted pursuant to the emergency declaration under the [address] [place] [address] authority and the [employer] Act. Patient identification was verified, and a caregiver was present when appropriate. The patient was located at home in a state where the provider was licensed to provide care.        --Ino Fine, DO

## 2022-03-25 NOTE — PROGRESS NOTES
Medicare Annual Wellness Visit    Nenita Dasilva is here for Medicare AWV, Congestion (Cold symptoms / congestion), Hypertension (High blood pressure readings, blood pressure cuff does not seem to be accurate.), and Headache (Pressure on left side of head x 1 day. 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[address]  -     Comprehensive Metabolic Panel; Future  -     CBC with Auto Differential; Future  Panhypopituitarism (HCC)  -     Comprehensive Metabolic Panel; Future  -     CBC with Auto Differential; Future  Other specified hypothyroidism  -     TSH; Future  -     Uric Acid; Future  -     Comprehensive Metabolic Panel; Future  -     CBC with Auto Differential; Future  Mixed hyperlipidemia  -     Comprehensive Metabolic Panel; Future  -     Lipid Panel; Future  -     CBC with Auto Differential; Future  IFG (impaired fasting glucose)  -     Comprehensive Metabolic Panel; Future  -     CBC with Auto Differential; Future  -     Hemoglobin A1C; Future  Primary hypertension  -     amLODIPine (NORVASC) 5 MG tablet; Take 1 tablet by mouth daily, Disp-90 tablet, R-1Normal  -     Comprehensive Metabolic Panel; Future  -     CBC with Auto Differential; Future  Fatigue, unspecified type  -     Comprehensive Metabolic Panel; Future  -     CBC with Auto Differential; Future  Cough  -     guaiFENesin (MUCINEX) 600 MG extended release tablet; Take 1 tablet by mouth 2 times daily for 15 days, Disp-30 tablet, R-0Normal  -     Comprehensive Metabolic Panel;  Future  -     CBC with Auto Differential; Future      Recommendations for Preventive Services Due: see orders and patient instructions/AVS.  Recommended screening schedule for the next 5-10 years is provided to the patient in written form: see Patient Instructions/AVS.     No follow-ups on file. Subjective   The following acute and/or chronic problems were also addressed today:  Her blood pressure has been high at home    Patient's complete Health Risk Assessment and screening values have been reviewed and are found in Flowsheets. The following problems were reviewed today and where indicated follow up appointments were made and/or referrals ordered.     Positive Risk Factor Screenings with Interventions:               General Health and ACP:  General  In general, how would you say your health is?: Excellent  In the past 7 days, have you experienced any of the following: New or Increased Pain, New or Increased Fatigue, Loneliness, Social Isolation, Stress or Anger?: No  Do you get the social and emotional support that you need?: Yes  Do you have a Living Will?: (!) No    Advance Directives     Power of  Living Will ACP-Advance Directive ACP-Power of     Not on File Filed on 06/29/13 Filed Not on File      General Health Risk Interventions:  · she has been having trouble with her blood pressure     Health Habits/Nutrition:     Physical Activity: Insufficiently Active    Days of Exercise per Week: 5 days    Minutes of Exercise per Session: 20 min     Have you lost any weight without trying in the past 3 months?: No  Body mass index: (!) 28.84  Have you seen the dentist within the past year?: (!) No    Health Habits/Nutrition Interventions:  · no acute issues    Hearing/Vision:  Do you or your family notice any trouble with your hearing that hasn't been managed with hearing aids?: No  Do you have difficulty driving, watching TV, or doing any of your daily activities because of your eyesight?: No (patient is blind)  Have you had an eye exam within the past year?: (!) No  No exam data present    Hearing/Vision Interventions:  · she is blind from birth     Safety:  Do you have working smoke detectors?: Yes  Do you have any tripping hazards - loose or unsecured carpets or rugs?: No  Do you have any tripping hazards - clutter in doorways, halls, or stairs?: (!) Yes  Do you have either shower bars, grab bars, non-slip mats or non-slip surfaces in your shower or bathtub?: Yes  Do all of your stairways have a railing or banister?: Yes  Do you always fasten your seatbelt when you are in a car?: Yes    Safety Interventions:  · Home safety tips provided    ADLs:  In the past 7 days, did you need help from others to perform any of the following everyday activities: Eating, dressing, grooming, bathing, toileting, or walking/balance?: No  In the past 7 days, did you need help from others to take care of any of the following: Laundry, housekeeping, banking/finances, shopping, telephone use, food preparation, transportation, or taking medications?: (!) Yes  Select all that apply: (!) Taking Medications    ADL Interventions:  · Patient declines any further evaluation/treatment for this issue          Objective   Vitals:    03/25/22 1108 03/25/22 1137   BP: (!) 144/84 138/84   Pulse: 71    Resp: 16    Temp: 98.5 °F (36.9 °C)    SpO2: 98%    Weight: 138 lb (62.6 kg)    Height: 4' 10\" (1.473 m)       Body mass index is 28.84 kg/m².         General Appearance: alert and oriented to person, place and time, well-developed and well-nourished, in no acute distress  Skin: warm and dry, no rash or erythema  Head: normocephalic and atraumatic  Eyes: pupils equal, round, and reactive to light, extraocular eye movements intact, conjunctivae normal and she is blind OD and OS  ENT: tympanic membrane, external ear and ear canal normal bilaterally, oropharynx clear and moist with normal mucous membranes  Neck: neck supple and non tender without mass, no thyromegaly or thyroid nodules, no cervical lymphadenopathy   Pulmonary/Chest: clear to auscultation bilaterally- no wheezes, rales or rhonchi, normal air movement, no respiratory distress  Cardiovascular: normal rate, regular rhythm, normal S1 and S2, no murmurs, no gallops, intact distal pulses and no carotid bruits  Abdomen: soft, non-tender, non-distended, normal bowel sounds, no masses or organomegaly  Breast: breasts appear normal, no suspicious masses, no skin or nipple changes or axillary nodes  Extremities: no cyanosis and no clubbing  Musculoskeletal: normal range of motion, no joint swelling, deformity or tenderness  Neurologic: speech normal and Tripp's       No Known Allergies  Prior to Visit Medications    Medication Sig Taking? Authorizing Provider   amLODIPine (NORVASC) 5 MG tablet Take 1 tablet by mouth daily Yes Rudolph Ramachandran DO   guaiFENesin (MUCINEX) 600 MG extended release tablet Take 1 tablet by mouth 2 times daily for 15 days Yes Rudolph Ramachandran, DO   Cholecalciferol (VITAMIN D3) 50 MCG (2000 UT) CAPS Take 1 capsule by mouth once daily Yes Joanna Ramachandran DO   hydrocortisone (CORTEF) 10 MG tablet Take 1 tablet in morning and 0.5 tablet at 3 pm Yes Joanna Ramachandran DO   levothyroxine (SYNTHROID) 50 MCG tablet Take 1 tablet by mouth once daily Yes Joanna Ramachandran, DO   cefdinir (OMNICEF) 300 MG capsule Take 1 capsule by mouth 2 times daily for 7 days Yes Joanna Ramachandran, DO   hydroCHLOROthiazide (HYDRODIURIL) 25 MG tablet Take 1 tablet by mouth every morning Yes Joanna Ramachandran, DO   calcium carbonate 600 MG TABS tablet Take 1 tablet by mouth daily Yes Historical Provider, MD   chlorhexidine (PERIDEX) 0.12 % solution USE 15 ML BY MOUTH TWICE DAILY AS DIRECTED Yes Joanna Ramachandran DO   glucose monitoring kit (FREESTYLE) monitoring kit 1 kit by Does not apply route daily Yes Joanna Ramachandran DO   Lancets MISC 1 each by Does not apply route daily Patient is not insulin dependent, diagnosis E11.65 Yes Joanna Ramachandran DO   blood glucose monitor strips Test 1 times a day & as needed for symptoms of irregular blood glucose.  Dispense sufficient amount for indicated testing frequency plus additional to accommodate PRN testing needs.  Yes Joanna P Catterlin, DO   Omega-3 Fatty Acids (FISH OIL) 1200 MG CAPS Take 2 capsules by mouth once daily Yes Joanna P Catterlin, DO       CareTeam (Including outside providers/suppliers regularly involved in providing care):   Patient Care Team:  Dunia Harvey DO as PCP - General (Family Medicine)  Dunia Harvey DO as PCP - [facility] Bozena Franco Provider    Reviewed and updated this visit:  Tobacco  Allergies  Meds  Problems  Med Hx  Surg Hx  Soc Hx  Fam Hx

## 2022-03-28 ENCOUNTER — CLINICAL DOCUMENTATION (OUTPATIENT)
Dept: FAMILY MEDICINE CLINIC | Age: 52
End: 2022-03-28

## 2022-03-28 ENCOUNTER — OFFICE VISIT (OUTPATIENT)
Dept: FAMILY MEDICINE CLINIC | Age: 52
End: 2022-03-28
Payer: MEDICARE

## 2022-03-28 VITALS
TEMPERATURE: 97.7 F | DIASTOLIC BLOOD PRESSURE: 68 MMHG | RESPIRATION RATE: 16 BRPM | HEART RATE: 69 BPM | WEIGHT: 138 LBS | BODY MASS INDEX: 28.97 KG/M2 | HEIGHT: 58 IN | SYSTOLIC BLOOD PRESSURE: 116 MMHG | OXYGEN SATURATION: 99 %

## 2022-03-28 DIAGNOSIS — J01.90 ACUTE BACTERIAL SINUSITIS: ICD-10-CM

## 2022-03-28 DIAGNOSIS — Q04.4 SEPTO-OPTIC DYSPLASIA (HCC): ICD-10-CM

## 2022-03-28 DIAGNOSIS — E78.2 MIXED HYPERLIPIDEMIA: ICD-10-CM

## 2022-03-28 DIAGNOSIS — G47.00 INSOMNIA, UNSPECIFIED TYPE: ICD-10-CM

## 2022-03-28 DIAGNOSIS — I95.2 HYPOTENSION DUE TO DRUGS: Primary | ICD-10-CM

## 2022-03-28 DIAGNOSIS — E27.1 ADDISON DISEASE (HCC): ICD-10-CM

## 2022-03-28 DIAGNOSIS — E03.8 OTHER SPECIFIED HYPOTHYROIDISM: ICD-10-CM

## 2022-03-28 DIAGNOSIS — F41.9 ANXIETY: ICD-10-CM

## 2022-03-28 DIAGNOSIS — B96.89 ACUTE BACTERIAL SINUSITIS: ICD-10-CM

## 2022-03-28 DIAGNOSIS — E23.0 PANHYPOPITUITARISM (HCC): ICD-10-CM

## 2022-03-28 LAB
INFLUENZA A ANTIBODY: NON REACTIVE
INFLUENZA B ANTIBODY: NON REACTIVE
Lab: NORMAL
PERFORMING INSTRUMENT: NORMAL
QC PASS/FAIL: NORMAL
SARS-COV-2, POC: NORMAL

## 2022-03-28 PROCEDURE — G8484 FLU IMMUNIZE NO ADMIN: HCPCS | Performed by: FAMILY MEDICINE

## 2022-03-28 PROCEDURE — 99214 OFFICE O/P EST MOD 30 MIN: CPT | Performed by: FAMILY MEDICINE

## 2022-03-28 PROCEDURE — G8427 DOCREV CUR MEDS BY ELIG CLIN: HCPCS | Performed by: FAMILY MEDICINE

## 2022-03-28 PROCEDURE — 3017F COLORECTAL CA SCREEN DOC REV: CPT | Performed by: FAMILY MEDICINE

## 2022-03-28 PROCEDURE — 1036F TOBACCO NON-USER: CPT | Performed by: FAMILY MEDICINE

## 2022-03-28 PROCEDURE — 87426 SARSCOV CORONAVIRUS AG IA: CPT | Performed by: FAMILY MEDICINE

## 2022-03-28 PROCEDURE — G8417 CALC BMI ABV UP PARAM F/U: HCPCS | Performed by: FAMILY MEDICINE

## 2022-03-28 PROCEDURE — 87804 INFLUENZA ASSAY W/OPTIC: CPT | Performed by: FAMILY MEDICINE

## 2022-03-28 RX ORDER — ASPIRIN 81 MG/1
81 TABLET ORAL DAILY
Qty: 90 TABLET | Refills: 1 | Status: SHIPPED | OUTPATIENT
Start: 2022-03-28

## 2022-03-28 NOTE — PATIENT INSTRUCTIONS
Patient Education        High Cholesterol: Care Instructions  Overview     Cholesterol is a type of fat in your blood. It is needed for many body functions, such as making new cells. Cholesterol is made by your body. It also comes from food you eat. High cholesterol means that you have too much of the fat in your blood. This raises your risk of a heart attack and stroke. LDL and HDL are part of your total cholesterol. LDL is the \"bad\" cholesterol. High LDL can raise your risk for coronary artery disease, heart attack, and stroke. HDL is the \"good\" cholesterol. It helps clear bad cholesterol from the body. High HDL is linked with a lower risk of coronary artery disease, heart attack, and stroke. Your cholesterol levels help your doctor find out your risk for having a heart attack or stroke. You and your doctor can talk about whether you need to lower your risk and what treatment is best for you. Treatment options include a heart-healthy lifestyle and medicine. Both options can help lower your cholesterol and your risk. The way you choose to lower your risk will depend on how high your risk is for heart attack and stroke. It will also depend on how you feel about taking medicines. Follow-up care is a key part of your treatment and safety. Be sure to make and go to all appointments, and call your doctor if you are having problems. It's also a good idea to know your test results and keep a list of the medicines you take. How can you care for yourself at home? · Eat heart-healthy foods. ? Eat fruits, vegetables, whole grains, beans, and other high-fiber foods. ? Eat lean proteins, such as seafood, lean meats, beans, nuts, and soy products. ? Eat healthy fats, such as canola and olive oil. ? Choose foods that are low in saturated fat. ? Limit sodium and alcohol. ? Limit drinks and foods with added sugar. · Be physically active. Try to do moderate activity at least 2½ hours a week.  Or try to do vigorous activity at least 1¼ hours a week. You may want to walk or try other activities, such as running, swimming, cycling, or playing tennis or team sports. · Stay at a healthy weight or lose weight by making the changes in eating and physical activity listed above. Losing just a small amount of weight, even 5 to 10 pounds, can help reduce your risk for having a heart attack or stroke. · Do not smoke. · Manage other health problems. These include diabetes and high blood pressure. If you think you may have a problem with alcohol or drug use, talk to your doctor. · If you take medicine, take it exactly as prescribed. Call your doctor if you think you are having a problem with your medicine. · Check with your doctor or pharmacist before you use any other medicines, including over-the-counter medicines. Make sure your doctor knows all of the medicines, vitamins, herbal products, and supplements you take. Taking some medicines together can cause problems. When should you call for help? Watch closely for changes in your health, and be sure to contact your doctor if:    · You need help making lifestyle changes.     · You have questions about your medicine. Where can you learn more? Go to https://EVIIVOpeInfusionsofteb.[employer]. org and sign in to your [employer] account. Enter F814 in the Sha-Sha box to learn more about \"High Cholesterol: Care Instructions. \"     If you do not have an account, please click on the \"Sign Up Now\" link. Current as of: April 29, 2021               Content Version: 13.1  © 2006-2021 Healthwise, [employer]. Care instructions adapted under license by [facility] ([facility]). If you have questions about a medical condition or this instruction, always ask your healthcare professional. [place] [address] any warranty or liability for your use of this information. Patient Education        Learning About High Cholesterol  What is high cholesterol?      High cholesterol means that you have too much cholesterol in your blood. Cholesterol is a type of fat. It's needed for many body functions, such as making new cells. Cholesterol is made by your body. It also comes from food you eat. Having high cholesterol can lead to the buildup of plaque in artery walls. This can increase your risk of heart attack and stroke. When your doctor talks about high cholesterol levels, your doctor is talking about your total cholesterol and LDL cholesterol (the \"bad\" cholesterol) levels. Your doctor may also speak about HDL (the \"good\" cholesterol) levels. High HDL is linked with a lower risk for coronary artery disease, heart attack, and stroke. Your cholesterol levels help your doctor find out your risk for having a heart attack or stroke. How can you help prevent high cholesterol? A heart-healthy lifestyle can help you prevent high cholesterol and lower your risk for a heart attack and stroke. · Eat heart-healthy foods. ? Eat fruits, vegetables, whole grains, beans, and other high-fiber foods. ? Eat lean proteins, such as seafood, lean meats, beans, nuts, and soy products. ? Eat healthy fats, such as canola and olive oil. ? Choose foods that are low in saturated fat. ? Limit sodium and alcohol. ? Limit drinks and foods with added sugar. · Be active. Try to do moderate activity at least 2½ hours a week. Or try vigorous activity at least 1¼ hours a week. You may want to walk or try other activities, such as running, swimming, cycling, or playing tennis or team sports. · Stay at a healthy weight. Lose weight if you need to. · Don't smoke. If you need help quitting, talk to your doctor about stop-smoking programs and medicines. These can increase your chances of quitting for good. How is high cholesterol treated? The goal of treatment is to reduce your chances of having a heart attack or stroke. The goal is not to lower your cholesterol numbers only. · Have a heart-healthy lifestyle. This includes eating healthy foods, not smoking, losing weight, and being more active. · You may choose to take medicine. Follow-up care is a key part of your treatment and safety. Be sure to make and go to all appointments, and call your doctor if you are having problems. It's also a good idea to know your test results and keep a list of the medicines you take. Where can you learn more? Go to https://AroundWirepepicDealPingeb.[employer]. org and sign in to your [employer] account. Enter A128 in the Fatigue Science box to learn more about \"Learning About High Cholesterol. \"     If you do not have an account, please click on the \"Sign Up Now\" link. Current as of: April 29, 2021               Content Version: 13.1  © 0134-4749 Healthwise, Incorporated. Care instructions adapted under license by [facility] ([facility]). If you have questions about a medical condition or this instruction, always ask your healthcare professional. [place] [address] any warranty or liability for your use of this information.

## 2022-03-28 NOTE — PROGRESS NOTES
The 10-year ASCVD risk score (Beltran Oliver et al., 2013) is: 2.8%    Values used to calculate the score:      Age: 49 years      Sex: Female      Is Non- : No      Diabetic: No      Tobacco smoker: No      Systolic Blood Pressure: 463 mmHg      Is BP treated: Yes      HDL Cholesterol: 68 mg/dL      Total Cholesterol: 318 mg/dL

## 2022-03-31 ENCOUNTER — TELEPHONE (OUTPATIENT)
Dept: FAMILY MEDICINE CLINIC | Age: 52
End: 2022-03-31

## 2022-03-31 ENCOUNTER — TELEPHONE (OUTPATIENT)
Dept: ENDOCRINOLOGY | Age: 52
End: 2022-03-31

## 2022-03-31 DIAGNOSIS — E23.0 HYPOPITUITARISM (HCC): Primary | ICD-10-CM

## 2022-03-31 PROBLEM — I95.2 HYPOTENSION DUE TO DRUGS: Status: ACTIVE | Noted: 2022-03-31

## 2022-03-31 ASSESSMENT — ENCOUNTER SYMPTOMS
RECTAL PAIN: 0
SHORTNESS OF BREATH: 0
ANAL BLEEDING: 0
DIARRHEA: 0
SORE THROAT: 0
BLOOD IN STOOL: 0
NAUSEA: 0
VOMITING: 0
CHEST TIGHTNESS: 0
BACK PAIN: 0
CHOKING: 0
ABDOMINAL DISTENTION: 0
WHEEZING: 0
VOICE CHANGE: 0
STRIDOR: 0
COUGH: 1
SINUS PRESSURE: 1
SINUS PAIN: 1
CONSTIPATION: 0
RHINORRHEA: 0
FACIAL SWELLING: 0
ABDOMINAL PAIN: 0
COLOR CHANGE: 0
ALLERGIC/IMMUNOLOGIC NEGATIVE: 1
TROUBLE SWALLOWING: 0

## 2022-03-31 NOTE — TELEPHONE ENCOUNTER
The pt has been having tremors the past 5 days. She can't sleep because of them. She can't physically see herself shake, but she can internally feel it. She's going to call her family doctor, but she wanted to make sure that it wasn't endocrine related.

## 2022-03-31 NOTE — PROGRESS NOTES
Benjamin Hammonds is a 49 y.o. female. HPI/Chief C/O:  Chief Complaint   Patient presents with    Insomnia     Trouble falling asleep since last Friday, 3/25/2022. Feet cramps, spasms, shakiness.  Results     To review recent labwork results     No Known Allergies    This 49year old female presents with acute sinusitis. Pt was taking dayquil and nyquil. Pt was an increase in cortef from endocrinology. Pt expierienced an elevated BP, HCTZ was started, and 2 days ago norvasc was started for elevated BP. Pt has developed insomnia, chills, heart pounding, shakiness, muscle cramping, and anxiety. Pt is blind and lives with her parents. Pt has septo-optic dysplasia ([facility] [address] panhypopituitariam ([facility] [address] hypothyroid, aidan disease ([address] and hyperlipidemia. LDL is 227. ROS:  Review of Systems   Constitutional: Positive for activity change, appetite change, chills and fatigue. Negative for diaphoresis, fever and unexpected weight change. HENT: Positive for congestion, sinus pressure and sinus pain. Negative for dental problem, drooling, ear discharge, ear pain, facial swelling, hearing loss, mouth sores, nosebleeds, postnasal drip, rhinorrhea, sneezing, sore throat, tinnitus, trouble swallowing and voice change. Eyes:        Pt is blind. Respiratory: Positive for cough. Negative for choking, chest tightness, shortness of breath, wheezing and stridor. Cardiovascular: Negative. Negative for chest pain, palpitations and leg swelling. Gastrointestinal: Negative for abdominal distention, abdominal pain, anal bleeding, blood in stool, constipation, diarrhea, nausea, rectal pain and vomiting. Endocrine: Negative. Negative for cold intolerance, heat intolerance, polydipsia, polyphagia and polyuria. Genitourinary: Positive for frequency and urgency. Negative for decreased urine volume, difficulty urinating, dysuria, flank pain, genital sores, hematuria, menstrual problem and pelvic pain. Musculoskeletal: Positive for arthralgias and myalgias. Negative for back pain, gait problem, joint swelling, neck pain and neck stiffness. Skin: Negative. Negative for color change, pallor, rash and wound. Allergic/Immunologic: Negative. Neurological: Positive for weakness and light-headedness. Negative for dizziness, tremors, seizures, syncope, facial asymmetry, speech difficulty, numbness and headaches. Hematological: Negative. Negative for adenopathy. Does not bruise/bleed easily. Psychiatric/Behavioral: Positive for agitation and sleep disturbance. Negative for behavioral problems, confusion, decreased concentration, dysphoric mood, hallucinations, self-injury and suicidal ideas. The patient is nervous/anxious. The patient is not hyperactive.          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    congenital     Sleep apnea      Past Surgical History:   Procedure Laterality Date    DENTAL SURGERY N/A 3/11/2020    DENTAL EXTRACTIONS, ALVEOLOPLASTY performed by Sheila Miller DDS at [facility] SKIN BIOPSY  2009    TONSILLECTOMY  1978       Past Family Hx:  Reviewed with patient      Problem Relation Age of Onset    Heart Disease Father         unknown age and type    Other Mother         rheumatic fever     Cancer Maternal Grandmother         leukemia     No Known Problems Sister     No Known Problems Sister        Social Hx:  Reviewed with patient  Social History     Tobacco Use    Smoking status: Never Smoker    Smokeless tobacco: Never Used   Substance Use Topics    Alcohol use: No       OBJECTIVE  /68   Pulse 69   Temp 97.7 °F (36.5 °C)   Resp 16   Ht 4' 10\" (1.473 m)   Wt 138 lb (62.6 kg)   LMP 09/27/2011 (Exact Date)   SpO2 99%   Breastfeeding No   BMI 28.84 kg/m² Problem List:  Aurelia Be does not have any pertinent problems on file. PHYS EX:  Physical Exam  Vitals and nursing note reviewed. Constitutional:       General: She is not in acute distress. Appearance: She is well-developed. She is not ill-appearing, toxic-appearing or diaphoretic. Comments: Patient has morbid obesity. Patient instructed on low calorie, healthy diet. HENT:      Head: Normocephalic and atraumatic. Right Ear: External ear normal.      Left Ear: External ear normal.      Nose: Congestion and rhinorrhea present. Mouth/Throat:      Pharynx: Posterior oropharyngeal erythema present. No oropharyngeal exudate. Eyes:      Comments: Pt is blind. Neck:      Thyroid: No thyromegaly. Vascular: No carotid bruit or JVD. Trachea: No tracheal deviation. Cardiovascular:      Rate and Rhythm: Normal rate and regular rhythm. Heart sounds: Normal heart sounds. No murmur heard. No friction rub. No gallop. Pulmonary:      Effort: Pulmonary effort is normal. No respiratory distress. Breath sounds: Normal breath sounds. No stridor. No wheezing or rales. Chest:      Chest wall: No tenderness. Abdominal:      General: Bowel sounds are normal. There is no distension. Palpations: Abdomen is soft. There is no mass. Tenderness: There is no abdominal tenderness. There is no guarding or rebound. Hernia: No hernia is present. Musculoskeletal:         General: No swelling, tenderness, deformity or signs of injury. Normal range of motion. Cervical back: Normal range of motion and neck supple. No rigidity or tenderness. Right lower leg: No edema. Left lower leg: No edema. Lymphadenopathy:      Cervical: No cervical adenopathy. Skin:     General: Skin is warm. Coloration: Skin is not jaundiced or pale. Findings: No bruising, erythema, lesion or rash.    Neurological:      Mental Status: She is alert and oriented to person, place, and time. Cranial Nerves: No cranial nerve deficit. Sensory: No sensory deficit. Motor: Weakness present. No abnormal muscle tone. Coordination: Coordination normal.      Gait: Gait normal.      Deep Tendon Reflexes: Reflexes are normal and symmetric. Reflexes normal.   Psychiatric:         Mood and Affect: Mood normal.         Behavior: Behavior normal.         Thought Content: Thought content normal.         Judgment: Judgment normal.         ASSESSMENT/PLAN  Prem Harding was seen today for insomnia and results. Diagnoses and all orders for this visit:    Hypotension due to drugs  -     aspirin EC 81 MG EC tablet; Take 1 tablet by mouth daily  norvasc was stopped. HCTZ was decreased to 1/2.tablet daily. Septo-optic dysplasia (HCC)  -     aspirin EC 81 MG EC tablet; Take 1 tablet by mouth daily    Panhypopituitarism (HCC)  -     aspirin EC 81 MG EC tablet; Take 1 tablet by mouth daily    Other specified hypothyroidism  -     aspirin EC 81 MG EC tablet; Take 1 tablet by mouth daily  Controlled. Synthroid. [place] disease ([facility] [address]  -     aspirin EC 81 MG EC tablet; Take 1 tablet by mouth daily  Stable. Cortef. Mixed hyperlipidemia  -     aspirin EC 81 MG EC tablet; Take 1 tablet by mouth daily  Not controlled. Pt and mother instructed on low chol. Diet. Pt refused statin AMA. Acute bacterial sinusitis  -     POCT COVID-19, Antigen  -     POCT Influenza A/B    Pt instructed if any worse go ED ASAP.     Outpatient Encounter Medications as of 3/28/2022   Medication Sig Dispense Refill    aspirin EC 81 MG EC tablet Take 1 tablet by mouth daily 90 tablet 1    guaiFENesin (MUCINEX) 600 MG extended release tablet Take 1 tablet by mouth 2 times daily for 15 days 30 tablet 0    Cholecalciferol (VITAMIN D3) 50 MCG (2000 UT) CAPS Take 1 capsule by mouth once daily 90 capsule 1    hydrocortisone (CORTEF) 10 MG tablet Take 1 tablet in morning and 0.5 tablet at 3 pm 145 tablet 1    levothyroxine (SYNTHROID) 50 MCG tablet Take 1 tablet by mouth once daily 90 tablet 1    [] cefdinir (OMNICEF) 300 MG capsule Take 1 capsule by mouth 2 times daily for 7 days 14 capsule 0    hydroCHLOROthiazide (HYDRODIURIL) 25 MG tablet Take 1 tablet by mouth every morning (Patient taking differently: Take 25 mg by mouth every morning Take 1/2 tablet daily) 30 tablet 5    calcium carbonate 600 MG TABS tablet Take 1 tablet by mouth daily      chlorhexidine (PERIDEX) 0.12 % solution USE 15 ML BY MOUTH TWICE DAILY AS DIRECTED 473 mL 1    glucose monitoring kit (FREESTYLE) monitoring kit 1 kit by Does not apply route daily 1 kit 0    Lancets MISC 1 each by Does not apply route daily Patient is not insulin dependent, diagnosis E11.65 100 each 5    blood glucose monitor strips Test 1 times a day & as needed for symptoms of irregular blood glucose. Dispense sufficient amount for indicated testing frequency plus additional to accommodate PRN testing needs. 100 strip 3    Omega-3 Fatty Acids (FISH OIL) 1200 MG CAPS Take 2 capsules by mouth once daily 180 capsule 0    [DISCONTINUED] amLODIPine (NORVASC) 5 MG tablet Take 1 tablet by mouth daily 90 tablet 1     No facility-administered encounter medications on file as of 3/28/2022. No follow-ups on file.         Reviewed recent labs related to [facility] current problems      Discussed importance of regular Health Maintenance follow up  Health Maintenance   Topic    Hepatitis C screen     HIV screen     DTaP/Tdap/Td vaccine (1 - Tdap)    Cervical cancer screen     Colorectal Cancer Screen     COVID-19 Vaccine (2 - Moderna 3-dose series)    Shingles Vaccine (1 of 2)    Breast cancer screen     Depression Screen     TSH testing     Potassium monitoring     Creatinine monitoring     Annual Wellness Visit (AWV)     Lipid screen     Flu vaccine     Hepatitis A vaccine     Hepatitis B vaccine     Hib vaccine     Meningococcal (ACWY) vaccine     Pneumococcal 0-64 years Vaccine

## 2022-03-31 NOTE — TELEPHONE ENCOUNTER
Pt called stating that starting last Friday 03/25 she started experiencing shaking in her legs. Pt states that she does not feel like her legs are visibly shaking, but like she is shaking on the inside. Pt thinks that she may have some anxiety, which is also causing issues with sleeping at night. Pt wants to know if she should take something for anxiety or sleep? Please advise.     Pt also reports that home BP reading have been normal.       Electronically signed by Jagjit Gilliam MA on 3/31/22 at 2:50 PM EDT

## 2022-04-01 RX ORDER — BUSPIRONE HYDROCHLORIDE 5 MG/1
5 TABLET ORAL 2 TIMES DAILY PRN
Qty: 60 TABLET | Refills: 0 | Status: SHIPPED | OUTPATIENT
Start: 2022-04-01 | End: 2022-05-01

## 2022-04-01 NOTE — TELEPHONE ENCOUNTER
This MA spoke with pt. Pt advised Rx sent to pharmacy. Pt verbalized she understood.     Electronically signed by Laurita Griggs MA on 4/1/22 at 3:14 PM EDT

## 2022-04-08 ENCOUNTER — HOSPITAL ENCOUNTER (OUTPATIENT)
Age: 52
Discharge: HOME | End: 2022-04-08
Payer: MEDICARE

## 2022-04-08 DIAGNOSIS — E23.0 HYPOPITUITARISM (HCC): ICD-10-CM

## 2022-04-08 LAB
T4 FREE: 1.18 NG/DL (ref 0.93–1.7)
T4 TOTAL: 6.2 MCG/DL (ref 4.5–11.7)

## 2022-04-08 PROCEDURE — 84439 ASSAY OF FREE THYROXINE: CPT

## 2022-04-08 PROCEDURE — 36415 COLL VENOUS BLD VENIPUNCTURE: CPT

## 2022-04-14 ENCOUNTER — TELEPHONE (OUTPATIENT)
Dept: ENDOCRINOLOGY | Age: 52
End: 2022-04-14

## 2022-04-14 NOTE — TELEPHONE ENCOUNTER
Notify pt,  I have reviewed your recent results    thyroid hormones results are within an acceptable range of normal. There is no need for a change in your therapy at this time.

## 2022-10-01 DIAGNOSIS — E23.0 PANHYPOPITUITARISM (HCC): ICD-10-CM

## 2022-10-01 DIAGNOSIS — E03.9 HYPOTHYROIDISM, UNSPECIFIED TYPE: ICD-10-CM

## 2022-10-01 DIAGNOSIS — E27.1 ADDISON DISEASE (HCC): ICD-10-CM

## 2022-10-01 DIAGNOSIS — I10 HYPERTENSION, UNSPECIFIED TYPE: ICD-10-CM

## 2022-10-03 RX ORDER — HYDROCORTISONE 10 MG/1
TABLET ORAL
Qty: 145 TABLET | Refills: 1 | Status: SHIPPED | OUTPATIENT
Start: 2022-10-03

## 2022-10-03 RX ORDER — LEVOTHYROXINE SODIUM 0.05 MG/1
TABLET ORAL
Qty: 90 TABLET | Refills: 1 | Status: SHIPPED
Start: 2022-10-03 | End: 2022-10-03

## 2022-10-03 RX ORDER — ACETAMINOPHEN 160 MG
TABLET,DISINTEGRATING ORAL
Qty: 90 CAPSULE | Refills: 1 | Status: SHIPPED | OUTPATIENT
Start: 2022-10-03

## 2022-10-03 RX ORDER — HYDROCHLOROTHIAZIDE 25 MG/1
TABLET ORAL
Qty: 30 TABLET | Refills: 3 | Status: SHIPPED | OUTPATIENT
Start: 2022-10-03

## 2022-10-03 RX ORDER — LEVOTHYROXINE SODIUM 0.05 MG/1
TABLET ORAL
Qty: 90 TABLET | Refills: 0 | Status: SHIPPED | OUTPATIENT
Start: 2022-10-03

## 2023-03-09 LAB — MAMMOGRAPHY, EXTERNAL: NORMAL

## 2023-03-31 DIAGNOSIS — E23.0 PANHYPOPITUITARISM (HCC): ICD-10-CM

## 2023-03-31 DIAGNOSIS — E27.1 ADDISON DISEASE (HCC): ICD-10-CM

## 2023-03-31 DIAGNOSIS — E03.9 HYPOTHYROIDISM, UNSPECIFIED TYPE: ICD-10-CM

## 2023-04-04 RX ORDER — LEVOTHYROXINE SODIUM 0.05 MG/1
50 TABLET ORAL DAILY
Qty: 90 TABLET | Refills: 0 | OUTPATIENT
Start: 2023-04-04

## 2023-04-04 RX ORDER — HYDROCORTISONE 10 MG/1
TABLET ORAL
Qty: 145 TABLET | Refills: 1 | OUTPATIENT
Start: 2023-04-04

## 2023-04-04 RX ORDER — ACETAMINOPHEN 160 MG
TABLET,DISINTEGRATING ORAL
Qty: 90 CAPSULE | Refills: 1 | OUTPATIENT
Start: 2023-04-04

## 2023-04-04 NOTE — TELEPHONE ENCOUNTER
Last seen 3/28/2022    This LPN called pt and made an appointment for 4/10/2023.       Electronically signed by Jan Campuzano LPN on 7/6/17 at 86:63 PM EDT

## 2023-04-05 RX ORDER — MULTIVIT-MIN/IRON/FOLIC ACID/K 18-600-40
CAPSULE ORAL
Qty: 90 CAPSULE | Refills: 0 | Status: SHIPPED | OUTPATIENT
Start: 2023-04-05

## 2023-04-05 RX ORDER — LEVOTHYROXINE SODIUM 0.05 MG/1
TABLET ORAL
Qty: 90 TABLET | Refills: 0 | Status: SHIPPED | OUTPATIENT
Start: 2023-04-05

## 2023-04-05 RX ORDER — HYDROCORTISONE 10 MG/1
TABLET ORAL
Qty: 145 TABLET | Refills: 0 | Status: SHIPPED | OUTPATIENT
Start: 2023-04-05

## 2023-04-11 ENCOUNTER — TELEPHONE (OUTPATIENT)
Dept: FAMILY MEDICINE CLINIC | Age: 53
End: 2023-04-11

## 2023-04-13 PROBLEM — R53.83 FATIGUE: Status: ACTIVE | Noted: 2023-04-13

## 2023-04-13 PROBLEM — Z12.11 SCREENING FOR COLON CANCER: Status: ACTIVE | Noted: 2023-04-13

## 2023-05-13 PROBLEM — Z12.11 SCREENING FOR COLON CANCER: Status: RESOLVED | Noted: 2023-04-13 | Resolved: 2023-05-13

## 2023-10-19 DIAGNOSIS — E27.1 ADDISON DISEASE (HCC): ICD-10-CM

## 2023-10-19 DIAGNOSIS — E23.0 PANHYPOPITUITARISM (HCC): ICD-10-CM

## 2023-10-20 RX ORDER — HYDROCORTISONE 10 MG/1
TABLET ORAL
Qty: 145 TABLET | Refills: 0 | Status: SHIPPED | OUTPATIENT
Start: 2023-10-20

## 2024-01-04 DIAGNOSIS — E23.0 PANHYPOPITUITARISM (HCC): ICD-10-CM

## 2024-01-04 DIAGNOSIS — E03.9 HYPOTHYROIDISM, UNSPECIFIED TYPE: ICD-10-CM

## 2024-01-05 RX ORDER — LEVOTHYROXINE SODIUM 0.05 MG/1
50 TABLET ORAL DAILY
Qty: 30 TABLET | Refills: 0 | Status: SHIPPED | OUTPATIENT
Start: 2024-01-05

## 2024-01-05 RX ORDER — MULTIVIT-MIN/IRON/FOLIC ACID/K 18-600-40
CAPSULE ORAL
Qty: 30 CAPSULE | Refills: 0 | Status: SHIPPED | OUTPATIENT
Start: 2024-01-05

## 2024-02-05 DIAGNOSIS — E27.1 ADDISON DISEASE (HCC): ICD-10-CM

## 2024-02-05 DIAGNOSIS — E03.9 HYPOTHYROIDISM, UNSPECIFIED TYPE: ICD-10-CM

## 2024-02-05 DIAGNOSIS — E23.0 PANHYPOPITUITARISM (HCC): ICD-10-CM

## 2024-02-05 RX ORDER — HYDROCORTISONE 10 MG/1
TABLET ORAL
Qty: 145 TABLET | Refills: 0 | Status: SHIPPED | OUTPATIENT
Start: 2024-02-05

## 2024-02-05 RX ORDER — LEVOTHYROXINE SODIUM 0.05 MG/1
50 TABLET ORAL DAILY
Qty: 30 TABLET | Refills: 0 | Status: SHIPPED | OUTPATIENT
Start: 2024-02-05

## 2024-02-06 RX ORDER — HYDROCORTISONE 10 MG/1
TABLET ORAL
Qty: 145 TABLET | Refills: 0 | OUTPATIENT
Start: 2024-02-06

## 2024-02-06 RX ORDER — LEVOTHYROXINE SODIUM 0.05 MG/1
50 TABLET ORAL DAILY
Qty: 30 TABLET | Refills: 0 | OUTPATIENT
Start: 2024-02-06

## 2024-02-07 RX ORDER — MULTIVIT-MIN/IRON/FOLIC ACID/K 18-600-40
1 CAPSULE ORAL DAILY
Qty: 30 CAPSULE | Refills: 0 | Status: SHIPPED | OUTPATIENT
Start: 2024-02-07

## 2024-02-21 ENCOUNTER — OFFICE VISIT (OUTPATIENT)
Dept: FAMILY MEDICINE CLINIC | Age: 54
End: 2024-02-21
Payer: COMMERCIAL

## 2024-02-21 ENCOUNTER — HOSPITAL ENCOUNTER (OUTPATIENT)
Age: 54
Discharge: HOME | End: 2024-02-21
Payer: COMMERCIAL

## 2024-02-21 VITALS
TEMPERATURE: 97.5 F | HEIGHT: 58 IN | RESPIRATION RATE: 18 BRPM | BODY MASS INDEX: 32.16 KG/M2 | OXYGEN SATURATION: 98 % | HEART RATE: 66 BPM | SYSTOLIC BLOOD PRESSURE: 124 MMHG | DIASTOLIC BLOOD PRESSURE: 78 MMHG | WEIGHT: 153.2 LBS

## 2024-02-21 DIAGNOSIS — R53.83 FATIGUE, UNSPECIFIED TYPE: ICD-10-CM

## 2024-02-21 DIAGNOSIS — E27.1 ADDISON DISEASE (HCC): ICD-10-CM

## 2024-02-21 DIAGNOSIS — E03.9 HYPOTHYROIDISM, UNSPECIFIED TYPE: ICD-10-CM

## 2024-02-21 DIAGNOSIS — E78.2 MIXED HYPERLIPIDEMIA: ICD-10-CM

## 2024-02-21 DIAGNOSIS — Z12.11 SCREENING FOR COLON CANCER: ICD-10-CM

## 2024-02-21 DIAGNOSIS — E23.0 PANHYPOPITUITARISM (HCC): ICD-10-CM

## 2024-02-21 DIAGNOSIS — R73.01 IFG (IMPAIRED FASTING GLUCOSE): ICD-10-CM

## 2024-02-21 DIAGNOSIS — E23.0 PANHYPOPITUITARISM (HCC): Primary | ICD-10-CM

## 2024-02-21 DIAGNOSIS — Q04.4 SEPTO-OPTIC DYSPLASIA (HCC): ICD-10-CM

## 2024-02-21 LAB
25(OH)D3 SERPL-MCNC: 63.5 NG/ML (ref 30–100)
ALBUMIN SERPL-MCNC: 4.8 G/DL (ref 3.5–5.2)
ALP SERPL-CCNC: 69 U/L (ref 35–104)
ALT SERPL-CCNC: 12 U/L (ref 0–32)
ANION GAP SERPL CALCULATED.3IONS-SCNC: 13 MMOL/L (ref 7–16)
AST SERPL-CCNC: 23 U/L (ref 0–31)
BASOPHILS # BLD: 0.08 K/UL (ref 0–0.2)
BASOPHILS NFR BLD: 1 % (ref 0–2)
BILIRUB SERPL-MCNC: 0.4 MG/DL (ref 0–1.2)
BUN SERPL-MCNC: 13 MG/DL (ref 6–20)
CALCIUM SERPL-MCNC: 9.7 MG/DL (ref 8.6–10.2)
CHLORIDE SERPL-SCNC: 102 MMOL/L (ref 98–107)
CHOLEST SERPL-MCNC: 295 MG/DL
CO2 SERPL-SCNC: 25 MMOL/L (ref 22–29)
CORTIS SERPL-MCNC: 5.4 UG/DL (ref 2.7–18.4)
CREAT SERPL-MCNC: 0.9 MG/DL (ref 0.5–1)
EOSINOPHIL # BLD: 0.06 K/UL (ref 0.05–0.5)
EOSINOPHILS RELATIVE PERCENT: 1 % (ref 0–6)
ERYTHROCYTE [DISTWIDTH] IN BLOOD BY AUTOMATED COUNT: 12.8 % (ref 11.5–15)
GFR SERPL CREATININE-BSD FRML MDRD: >60 ML/MIN/1.73M2
GLUCOSE SERPL-MCNC: 101 MG/DL (ref 74–99)
HBA1C MFR BLD: 5.8 % (ref 4–5.6)
HCT VFR BLD AUTO: 40.6 % (ref 34–48)
HDLC SERPL-MCNC: 67 MG/DL
HGB BLD-MCNC: 13.9 G/DL (ref 11.5–15.5)
IMM GRANULOCYTES # BLD AUTO: <0.03 K/UL (ref 0–0.58)
IMM GRANULOCYTES NFR BLD: 0 % (ref 0–5)
LDLC SERPL CALC-MCNC: 204 MG/DL
LYMPHOCYTES NFR BLD: 2.29 K/UL (ref 1.5–4)
LYMPHOCYTES RELATIVE PERCENT: 35 % (ref 20–42)
MCH RBC QN AUTO: 28.5 PG (ref 26–35)
MCHC RBC AUTO-ENTMCNC: 34.2 G/DL (ref 32–34.5)
MCV RBC AUTO: 83.4 FL (ref 80–99.9)
MONOCYTES NFR BLD: 0.28 K/UL (ref 0.1–0.95)
MONOCYTES NFR BLD: 4 % (ref 2–12)
NEUTROPHILS NFR BLD: 58 % (ref 43–80)
NEUTS SEG NFR BLD: 3.75 K/UL (ref 1.8–7.3)
PLATELET # BLD AUTO: 201 K/UL (ref 130–450)
PMV BLD AUTO: 11.6 FL (ref 7–12)
POTASSIUM SERPL-SCNC: 4.3 MMOL/L (ref 3.5–5)
PROCALCITONIN SERPL-MCNC: 0.04 NG/ML (ref 0–0.08)
PROT SERPL-MCNC: 7.6 G/DL (ref 6.4–8.3)
RBC # BLD AUTO: 4.87 M/UL (ref 3.5–5.5)
SODIUM SERPL-SCNC: 140 MMOL/L (ref 132–146)
TRIGL SERPL-MCNC: 120 MG/DL
TSH SERPL DL<=0.05 MIU/L-ACNC: 0.59 UIU/ML (ref 0.27–4.2)
VLDLC SERPL CALC-MCNC: 24 MG/DL
WBC OTHER # BLD: 6.5 K/UL (ref 4.5–11.5)

## 2024-02-21 PROCEDURE — 80053 COMPREHEN METABOLIC PANEL: CPT

## 2024-02-21 PROCEDURE — 84443 ASSAY THYROID STIM HORMONE: CPT

## 2024-02-21 PROCEDURE — 85025 COMPLETE CBC W/AUTO DIFF WBC: CPT

## 2024-02-21 PROCEDURE — 84145 PROCALCITONIN (PCT): CPT

## 2024-02-21 PROCEDURE — 36415 COLL VENOUS BLD VENIPUNCTURE: CPT

## 2024-02-21 PROCEDURE — 99214 OFFICE O/P EST MOD 30 MIN: CPT | Performed by: FAMILY MEDICINE

## 2024-02-21 PROCEDURE — 82306 VITAMIN D 25 HYDROXY: CPT

## 2024-02-21 PROCEDURE — 3078F DIAST BP <80 MM HG: CPT | Performed by: FAMILY MEDICINE

## 2024-02-21 PROCEDURE — 3074F SYST BP LT 130 MM HG: CPT | Performed by: FAMILY MEDICINE

## 2024-02-21 PROCEDURE — 80061 LIPID PANEL: CPT

## 2024-02-21 PROCEDURE — 82533 TOTAL CORTISOL: CPT

## 2024-02-21 PROCEDURE — 83036 HEMOGLOBIN GLYCOSYLATED A1C: CPT

## 2024-02-21 RX ORDER — MULTIVIT-MIN/IRON/FOLIC ACID/K 18-600-40
1 CAPSULE ORAL DAILY
Qty: 90 CAPSULE | Refills: 1 | Status: SHIPPED | OUTPATIENT
Start: 2024-02-21

## 2024-02-21 RX ORDER — CHLORHEXIDINE GLUCONATE ORAL RINSE 1.2 MG/ML
SOLUTION DENTAL
Qty: 473 ML | Refills: 1 | Status: SHIPPED | OUTPATIENT
Start: 2024-02-21

## 2024-02-21 RX ORDER — HYDROCORTISONE 10 MG/1
TABLET ORAL
Qty: 145 TABLET | Refills: 1 | Status: SHIPPED | OUTPATIENT
Start: 2024-02-21

## 2024-02-21 RX ORDER — AMOXICILLIN 500 MG
2 CAPSULE ORAL DAILY
Qty: 180 CAPSULE | Refills: 1 | Status: SHIPPED | OUTPATIENT
Start: 2024-02-21

## 2024-02-21 RX ORDER — LEVOTHYROXINE SODIUM 0.05 MG/1
50 TABLET ORAL DAILY
Qty: 90 TABLET | Refills: 1 | Status: SHIPPED | OUTPATIENT
Start: 2024-02-21

## 2024-02-21 ASSESSMENT — PATIENT HEALTH QUESTIONNAIRE - PHQ9
SUM OF ALL RESPONSES TO PHQ9 QUESTIONS 1 & 2: 0
1. LITTLE INTEREST OR PLEASURE IN DOING THINGS: 0
SUM OF ALL RESPONSES TO PHQ QUESTIONS 1-9: 0
2. FEELING DOWN, DEPRESSED OR HOPELESS: 0

## 2024-02-23 PROBLEM — Z12.11 SCREENING FOR COLON CANCER: Status: ACTIVE | Noted: 2024-02-23

## 2024-02-23 ASSESSMENT — ENCOUNTER SYMPTOMS
ALLERGIC/IMMUNOLOGIC NEGATIVE: 1
NAUSEA: 0
ABDOMINAL PAIN: 0
COLOR CHANGE: 0
BLOOD IN STOOL: 0
SHORTNESS OF BREATH: 0
ABDOMINAL DISTENTION: 0
RHINORRHEA: 0
ANAL BLEEDING: 0
DIARRHEA: 0
SINUS PAIN: 0
SORE THROAT: 0
COUGH: 0
CHEST TIGHTNESS: 0
SINUS PRESSURE: 0
CHOKING: 0
STRIDOR: 0
VOMITING: 0
CONSTIPATION: 0
RECTAL PAIN: 0
BACK PAIN: 0
VOICE CHANGE: 0
TROUBLE SWALLOWING: 0
WHEEZING: 0
FACIAL SWELLING: 0

## 2024-02-23 NOTE — PROGRESS NOTES
HENT:      Head: Normocephalic and atraumatic.      Nose: No congestion or rhinorrhea.      Mouth/Throat:      Pharynx: No oropharyngeal exudate or posterior oropharyngeal erythema.   Eyes:      Comments: Pt is blind.   Neck:      Thyroid: No thyromegaly.      Vascular: No carotid bruit or JVD.      Trachea: No tracheal deviation.   Cardiovascular:      Rate and Rhythm: Normal rate and regular rhythm.      Heart sounds: Normal heart sounds. No murmur heard.     No friction rub. No gallop.   Pulmonary:      Effort: Pulmonary effort is normal. No respiratory distress.      Breath sounds: Normal breath sounds. No stridor. No wheezing, rhonchi or rales.   Chest:      Chest wall: No tenderness.   Abdominal:      General: Bowel sounds are normal. There is no distension.      Palpations: Abdomen is soft. There is no mass.      Tenderness: There is no abdominal tenderness. There is no guarding or rebound.      Hernia: No hernia is present.   Musculoskeletal:         General: No swelling, tenderness, deformity or signs of injury. Normal range of motion.      Cervical back: Normal range of motion and neck supple. No rigidity or tenderness.      Right lower leg: No edema.      Left lower leg: No edema.   Lymphadenopathy:      Cervical: No cervical adenopathy.   Skin:     General: Skin is warm.      Coloration: Skin is not jaundiced or pale.      Findings: No bruising, erythema, lesion or rash.   Neurological:      Mental Status: She is alert and oriented to person, place, and time.      Cranial Nerves: No cranial nerve deficit.      Sensory: No sensory deficit.      Motor: No weakness or abnormal muscle tone.      Coordination: Coordination abnormal.      Gait: Gait normal.      Deep Tendon Reflexes: Reflexes normal.   Psychiatric:         Mood and Affect: Mood normal.         Behavior: Behavior normal.         Thought Content: Thought content normal.         Judgment: Judgment normal.         ASSESSMENT/PLAN  Domonique was seen

## 2024-03-04 ENCOUNTER — TELEPHONE (OUTPATIENT)
Dept: FAMILY MEDICINE CLINIC | Age: 54
End: 2024-03-04

## 2024-03-04 NOTE — TELEPHONE ENCOUNTER
----- Message from Joanna Ramachandran,  sent at 3/4/2024  2:35 PM EST -----  Call pt and her mother and let them know LDL is 204    follow low chol. Diet    ask if want to start statin    if she does send in lipitor 10 mg number 30 take one tablet daily with 2 refills

## 2024-03-07 LAB — NONINV COLON CA DNA+OCC BLD SCRN STL QL: NEGATIVE

## 2024-03-24 PROBLEM — Z12.11 SCREENING FOR COLON CANCER: Status: RESOLVED | Noted: 2024-02-23 | Resolved: 2024-03-24

## 2024-04-04 LAB — MAMMOGRAPHY, EXTERNAL: NORMAL

## 2024-08-12 DIAGNOSIS — E03.9 HYPOTHYROIDISM, UNSPECIFIED TYPE: ICD-10-CM

## 2024-08-12 RX ORDER — LEVOTHYROXINE SODIUM 50 MCG
50 TABLET ORAL DAILY
Qty: 90 TABLET | Refills: 0 | Status: SHIPPED | OUTPATIENT
Start: 2024-08-12

## 2024-08-12 NOTE — TELEPHONE ENCOUNTER
Last seen 2/21/2024  Next appt Visit date not found    Requested Prescriptions     Pending Prescriptions Disp Refills    levothyroxine (SYNTHROID) 50 MCG tablet [Pharmacy Med Name: SYNTHROID TAB 50MCG] 90 tablet 0     Sig: TAKE 1 TABLET DAILY        Electronically signed by MAGO TINSLEY MA on 8/12/24 at 10:23 AM EDT

## 2024-08-26 DIAGNOSIS — E23.0 PANHYPOPITUITARISM (HCC): ICD-10-CM

## 2024-08-26 DIAGNOSIS — E27.1 ADDISON DISEASE (HCC): ICD-10-CM

## 2024-08-26 RX ORDER — HYDROCORTISONE 10 MG/1
TABLET ORAL
Qty: 145 TABLET | Refills: 1 | Status: SHIPPED | OUTPATIENT
Start: 2024-08-26

## 2024-08-26 NOTE — TELEPHONE ENCOUNTER
Last Appointment:  2/21/2024  Future Appointments   Date Time Provider Department Center   2/21/2025  8:00 AM Joanna Ramachandran DO [facility]    Electronically signed by Heather Webb LPN on 8/26/24 at 8:54 AM EDT

## 2024-11-10 DIAGNOSIS — E03.9 HYPOTHYROIDISM, UNSPECIFIED TYPE: ICD-10-CM

## 2024-11-11 RX ORDER — LEVOTHYROXINE SODIUM 50 MCG
50 TABLET ORAL DAILY
Qty: 90 TABLET | Refills: 0 | Status: SHIPPED | OUTPATIENT
Start: 2024-11-11

## 2024-11-11 NOTE — TELEPHONE ENCOUNTER
Last Appointment:  2/21/2024  Future Appointments   Date Time Provider Department Center   2/21/2025  8:00 AM Joanna Ramachandran, DO [facility]

## 2025-02-08 DIAGNOSIS — E03.9 HYPOTHYROIDISM, UNSPECIFIED TYPE: ICD-10-CM

## 2025-02-10 RX ORDER — LEVOTHYROXINE SODIUM 50 MCG
50 TABLET ORAL DAILY
Qty: 90 TABLET | Refills: 0 | Status: SHIPPED | OUTPATIENT
Start: 2025-02-10

## 2025-02-10 NOTE — TELEPHONE ENCOUNTER
Last seen 2/21/2024  Next appt 2/21/2025    Requested Prescriptions     Pending Prescriptions Disp Refills    SYNTHROID 50 MCG tablet [Pharmacy Med Name: SYNTHROID TAB 50MCG] 90 tablet 0     Sig: TAKE 1 TABLET DAILY    Electronically signed by MAGO TINSLEY MA on 2/10/25 at 9:05 AM EST

## 2025-02-21 ENCOUNTER — TELEMEDICINE (OUTPATIENT)
Dept: FAMILY MEDICINE CLINIC | Age: 55
End: 2025-02-21
Payer: MEDICARE

## 2025-02-21 DIAGNOSIS — E23.0 PANHYPOPITUITARISM: ICD-10-CM

## 2025-02-21 DIAGNOSIS — Z00.00 MEDICARE ANNUAL WELLNESS VISIT, SUBSEQUENT: Primary | ICD-10-CM

## 2025-02-21 DIAGNOSIS — E27.1 ADDISON DISEASE (HCC): ICD-10-CM

## 2025-02-21 DIAGNOSIS — Q04.4 SEPTO-OPTIC DYSPLASIA (HCC): ICD-10-CM

## 2025-02-21 DIAGNOSIS — E03.9 HYPOTHYROIDISM, UNSPECIFIED TYPE: ICD-10-CM

## 2025-02-21 DIAGNOSIS — E78.2 MIXED HYPERLIPIDEMIA: ICD-10-CM

## 2025-02-21 DIAGNOSIS — R73.01 IFG (IMPAIRED FASTING GLUCOSE): ICD-10-CM

## 2025-02-21 DIAGNOSIS — R53.83 FATIGUE, UNSPECIFIED TYPE: ICD-10-CM

## 2025-02-21 PROCEDURE — G0439 PPPS, SUBSEQ VISIT: HCPCS | Performed by: FAMILY MEDICINE

## 2025-02-21 RX ORDER — CHLORHEXIDINE GLUCONATE ORAL RINSE 1.2 MG/ML
SOLUTION DENTAL
Qty: 473 ML | Refills: 2 | Status: SHIPPED | OUTPATIENT
Start: 2025-02-21

## 2025-02-21 RX ORDER — AMOXICILLIN 500 MG
2 CAPSULE ORAL DAILY
Qty: 180 CAPSULE | Refills: 1 | Status: SHIPPED | OUTPATIENT
Start: 2025-02-21

## 2025-02-21 RX ORDER — PHENOL 1.4 %
1 AEROSOL, SPRAY (ML) MUCOUS MEMBRANE DAILY
Qty: 90 TABLET | Refills: 1 | Status: SHIPPED | OUTPATIENT
Start: 2025-02-21

## 2025-02-21 RX ORDER — LEVOTHYROXINE SODIUM 50 UG/1
50 TABLET ORAL DAILY
Qty: 90 TABLET | Refills: 1 | Status: SHIPPED | OUTPATIENT
Start: 2025-02-21

## 2025-02-21 RX ORDER — MULTIVIT-MIN/IRON/FOLIC ACID/K 18-600-40
2000 CAPSULE ORAL DAILY
Qty: 90 CAPSULE | Refills: 1 | Status: SHIPPED | OUTPATIENT
Start: 2025-02-21

## 2025-02-21 SDOH — ECONOMIC STABILITY: FOOD INSECURITY: WITHIN THE PAST 12 MONTHS, YOU WORRIED THAT YOUR FOOD WOULD RUN OUT BEFORE YOU GOT MONEY TO BUY MORE.: NEVER TRUE

## 2025-02-21 SDOH — ECONOMIC STABILITY: FOOD INSECURITY: WITHIN THE PAST 12 MONTHS, THE FOOD YOU BOUGHT JUST DIDN'T LAST AND YOU DIDN'T HAVE MONEY TO GET MORE.: NEVER TRUE

## 2025-02-21 ASSESSMENT — PATIENT HEALTH QUESTIONNAIRE - PHQ9
SUM OF ALL RESPONSES TO PHQ9 QUESTIONS 1 & 2: 0
2. FEELING DOWN, DEPRESSED OR HOPELESS: NOT AT ALL
1. LITTLE INTEREST OR PLEASURE IN DOING THINGS: NOT AT ALL
SUM OF ALL RESPONSES TO PHQ QUESTIONS 1-9: 0

## 2025-02-21 ASSESSMENT — LIFESTYLE VARIABLES
HOW OFTEN DO YOU HAVE A DRINK CONTAINING ALCOHOL: NEVER
HOW MANY STANDARD DRINKS CONTAINING ALCOHOL DO YOU HAVE ON A TYPICAL DAY: PATIENT DOES NOT DRINK

## 2025-02-24 ENCOUNTER — HOSPITAL ENCOUNTER (OUTPATIENT)
Age: 55
Discharge: HOME | End: 2025-02-24
Payer: MEDICARE

## 2025-02-24 DIAGNOSIS — E23.0 PANHYPOPITUITARISM: ICD-10-CM

## 2025-02-24 DIAGNOSIS — E27.1 ADDISON DISEASE (HCC): ICD-10-CM

## 2025-02-24 DIAGNOSIS — Z00.00 MEDICARE ANNUAL WELLNESS VISIT, SUBSEQUENT: ICD-10-CM

## 2025-02-24 DIAGNOSIS — E03.9 HYPOTHYROIDISM, UNSPECIFIED TYPE: ICD-10-CM

## 2025-02-24 DIAGNOSIS — E78.2 MIXED HYPERLIPIDEMIA: ICD-10-CM

## 2025-02-24 LAB
ALBUMIN SERPL-MCNC: 4.4 G/DL (ref 3.5–5.2)
ALP SERPL-CCNC: 56 U/L (ref 35–104)
ALT SERPL-CCNC: 9 U/L (ref 0–32)
ANION GAP SERPL CALCULATED.3IONS-SCNC: 10 MMOL/L (ref 7–16)
AST SERPL-CCNC: 19 U/L (ref 0–31)
BASOPHILS # BLD: 0.07 K/UL (ref 0–0.2)
BASOPHILS NFR BLD: 1 % (ref 0–2)
BILIRUB SERPL-MCNC: 0.4 MG/DL (ref 0–1.2)
BUN SERPL-MCNC: 18 MG/DL (ref 6–20)
CALCIUM SERPL-MCNC: 9.8 MG/DL (ref 8.6–10.2)
CHLORIDE SERPL-SCNC: 102 MMOL/L (ref 98–107)
CHOLEST SERPL-MCNC: 259 MG/DL
CO2 SERPL-SCNC: 26 MMOL/L (ref 22–29)
CORTIS SERPL-MCNC: 18.5 UG/DL (ref 2.7–18.4)
CREAT SERPL-MCNC: 1.1 MG/DL (ref 0.5–1)
EOSINOPHIL # BLD: 0.12 K/UL (ref 0.05–0.5)
EOSINOPHILS RELATIVE PERCENT: 2 % (ref 0–6)
ERYTHROCYTE [DISTWIDTH] IN BLOOD BY AUTOMATED COUNT: 12.8 % (ref 11.5–15)
GFR, ESTIMATED: 63 ML/MIN/1.73M2
GLUCOSE SERPL-MCNC: 99 MG/DL (ref 74–99)
HBA1C MFR BLD: 5.6 % (ref 4–5.6)
HCT VFR BLD AUTO: 40.9 % (ref 34–48)
HDLC SERPL-MCNC: 54 MG/DL
HGB BLD-MCNC: 13.6 G/DL (ref 11.5–15.5)
IMM GRANULOCYTES # BLD AUTO: <0.03 K/UL (ref 0–0.58)
IMM GRANULOCYTES NFR BLD: 0 % (ref 0–5)
LDLC SERPL CALC-MCNC: 172 MG/DL
LYMPHOCYTES NFR BLD: 1.85 K/UL (ref 1.5–4)
LYMPHOCYTES RELATIVE PERCENT: 26 % (ref 20–42)
MCH RBC QN AUTO: 28 PG (ref 26–35)
MCHC RBC AUTO-ENTMCNC: 33.3 G/DL (ref 32–34.5)
MCV RBC AUTO: 84.3 FL (ref 80–99.9)
MONOCYTES NFR BLD: 0.43 K/UL (ref 0.1–0.95)
MONOCYTES NFR BLD: 6 % (ref 2–12)
NEUTROPHILS NFR BLD: 65 % (ref 43–80)
NEUTS SEG NFR BLD: 4.69 K/UL (ref 1.8–7.3)
PLATELET # BLD AUTO: 219 K/UL (ref 130–450)
PMV BLD AUTO: 11.9 FL (ref 7–12)
POTASSIUM SERPL-SCNC: 4.1 MMOL/L (ref 3.5–5)
PROT SERPL-MCNC: 7.2 G/DL (ref 6.4–8.3)
RBC # BLD AUTO: 4.85 M/UL (ref 3.5–5.5)
SODIUM SERPL-SCNC: 138 MMOL/L (ref 132–146)
TRIGL SERPL-MCNC: 165 MG/DL
TSH SERPL DL<=0.05 MIU/L-ACNC: 0.57 UIU/ML (ref 0.27–4.2)
VLDLC SERPL CALC-MCNC: 33 MG/DL
WBC OTHER # BLD: 7.2 K/UL (ref 4.5–11.5)

## 2025-02-24 PROCEDURE — 36415 COLL VENOUS BLD VENIPUNCTURE: CPT

## 2025-02-24 PROCEDURE — 80053 COMPREHEN METABOLIC PANEL: CPT

## 2025-02-24 PROCEDURE — 83036 HEMOGLOBIN GLYCOSYLATED A1C: CPT

## 2025-02-24 PROCEDURE — 80061 LIPID PANEL: CPT

## 2025-02-24 PROCEDURE — 84443 ASSAY THYROID STIM HORMONE: CPT

## 2025-02-24 PROCEDURE — 82533 TOTAL CORTISOL: CPT

## 2025-02-24 PROCEDURE — 85025 COMPLETE CBC W/AUTO DIFF WBC: CPT

## 2025-02-24 RX ORDER — HYDROCORTISONE 10 MG/1
TABLET ORAL
Qty: 145 TABLET | Refills: 0 | Status: SHIPPED | OUTPATIENT
Start: 2025-02-24

## 2025-02-24 NOTE — TELEPHONE ENCOUNTER
Last seen 2/21/2025  Next appt Visit date not found    Requested Prescriptions     Pending Prescriptions Disp Refills    hydrocortisone (CORTEF) 10 MG tablet [Pharmacy Med Name: Hydrocortisone 10 MG Oral Tablet] 145 tablet 0     Sig: TAKE 1 TABLET BY MOUTH IN THE MORNING AND 1/2 (ONE-HALF) TABLET AT 3PM    Electronically signed by MAGO TINSLEY MA on 2/24/25 at 12:06 PM EST

## 2025-02-24 NOTE — PROGRESS NOTES
Medicare Annual Wellness Visit    Domonique Gong is here for Medicare AWV (Pt being seen for her AWV.), Other (Lives in the state of [place] and consents to the VV.), and Medication Refill (Needs refills.)  TeleMedicine Video Visit    Domonique Gong, was evaluated through a synchronous (real-time) audio-video encounter. The patient (or guardian if applicable) is aware that this is a billable service., which includes applicable co-pays.  This virtual visit was conducted with the patient's  (and/or legal guardian's) consent.  This Virtual Visit was conducted with patient's (and/or legal guardian's) consent  Patient identification was verified, and a caregiver was present when appropriate.     The patient was located in a state where the provider was licensed to provide care.    The patient was located at Home: [address] [address]  Provider was located at Facility (Appt Dept): [address] [place] [address]     other people involved in call Pts mother     Patient identification was verified at the start of the visit, including the patient's telephone number and physical location. I discussed with the patient the nature of our telehealth visits, that:     Due to the nature of an audio- video modality, the only components of a physical exam that could be done are the elements supported by direct observation.  I would evaluate the patient and recommend diagnostics and treatments based on my assessment.  If it was felt that the patient should be evaluated in clinic or an emergency room setting, then they would be directed there.  Our sessions are not being recorded and that personal health information is protected.  Our team would provide follow up care in person if/when the patient needs it.         On this date 2/21/2025 I have spent 35 minutes reviewing previous notes, test results and face to face (virtual) with the patient discussing the diagnosis and importance of

## 2025-02-25 ENCOUNTER — TELEPHONE (OUTPATIENT)
Dept: FAMILY MEDICINE CLINIC | Age: 55
End: 2025-02-25

## 2025-02-25 DIAGNOSIS — E27.1 ADDISON DISEASE (HCC): ICD-10-CM

## 2025-02-25 DIAGNOSIS — R79.89 ELEVATED CORTISOL LEVEL: Primary | ICD-10-CM

## 2025-02-25 DIAGNOSIS — E03.9 HYPOTHYROIDISM, UNSPECIFIED TYPE: ICD-10-CM

## 2025-02-25 DIAGNOSIS — E23.0 PANHYPOPITUITARISM: ICD-10-CM

## 2025-02-25 DIAGNOSIS — Q04.4 SEPTO-OPTIC DYSPLASIA (HCC): ICD-10-CM

## 2025-02-25 NOTE — TELEPHONE ENCOUNTER
Received call from endo stating they have not seen the pt since 2021, asked for a new referral to be placed. Referral placed.     Electronically signed by MAGO TINSLEY MA on 2/25/25 at 11:10 AM EST

## 2025-03-22 ENCOUNTER — APPOINTMENT (OUTPATIENT)
Dept: GENERAL RADIOLOGY | Age: 55
End: 2025-03-22
Payer: OTHER MISCELLANEOUS

## 2025-03-22 ENCOUNTER — HOSPITAL ENCOUNTER (EMERGENCY)
Age: 55
Discharge: HOME | End: 2025-03-22
Attending: STUDENT IN AN ORGANIZED HEALTH CARE EDUCATION/TRAINING PROGRAM
Payer: OTHER MISCELLANEOUS

## 2025-03-22 VITALS
SYSTOLIC BLOOD PRESSURE: 148 MMHG | HEART RATE: 77 BPM | BODY MASS INDEX: 31.36 KG/M2 | TEMPERATURE: 98.6 F | DIASTOLIC BLOOD PRESSURE: 100 MMHG | OXYGEN SATURATION: 98 % | WEIGHT: 150 LBS | RESPIRATION RATE: 14 BRPM

## 2025-03-22 DIAGNOSIS — M79.645 PAIN OF FINGER OF LEFT HAND: ICD-10-CM

## 2025-03-22 DIAGNOSIS — R07.81 RIB PAIN ON RIGHT SIDE: Primary | ICD-10-CM

## 2025-03-22 PROCEDURE — 71101 X-RAY EXAM UNILAT RIBS/CHEST: CPT

## 2025-03-22 PROCEDURE — 73130 X-RAY EXAM OF HAND: CPT

## 2025-03-22 PROCEDURE — 99283 EMERGENCY DEPT VISIT LOW MDM: CPT

## 2025-03-22 PROCEDURE — 6370000000 HC RX 637 (ALT 250 FOR IP): Performed by: STUDENT IN AN ORGANIZED HEALTH CARE EDUCATION/TRAINING PROGRAM

## 2025-03-22 RX ORDER — LIDOCAINE 4 G/G
1 PATCH TOPICAL DAILY
Status: DISCONTINUED | OUTPATIENT
Start: 2025-03-22 | End: 2025-03-22 | Stop reason: HOSPADM

## 2025-03-22 ASSESSMENT — PAIN DESCRIPTION - DESCRIPTORS: DESCRIPTORS: DULL;SORE;DISCOMFORT

## 2025-03-22 ASSESSMENT — ENCOUNTER SYMPTOMS
VOMITING: 0
EYE DISCHARGE: 0
WHEEZING: 0
BACK PAIN: 0
EYE PAIN: 0
EYE REDNESS: 0
DIARRHEA: 0
SINUS PRESSURE: 0
NAUSEA: 0
SHORTNESS OF BREATH: 0
ABDOMINAL DISTENTION: 0
SORE THROAT: 0
COUGH: 0

## 2025-03-22 ASSESSMENT — PAIN DESCRIPTION - PAIN TYPE: TYPE: ACUTE PAIN

## 2025-03-22 ASSESSMENT — PAIN DESCRIPTION - LOCATION: LOCATION: HAND;RIB CAGE

## 2025-03-22 ASSESSMENT — PAIN - FUNCTIONAL ASSESSMENT: PAIN_FUNCTIONAL_ASSESSMENT: 0-10

## 2025-03-22 ASSESSMENT — PAIN DESCRIPTION - ORIENTATION: ORIENTATION: LEFT

## 2025-03-22 ASSESSMENT — PAIN SCALES - GENERAL: PAINLEVEL_OUTOF10: 1

## 2025-03-22 NOTE — DISCHARGE INSTRUCTIONS
XR HAND LEFT (MIN 3 VIEWS)   Final Result      No significant arthritic changes noted.  No evidence of acute fracture or   traumatic malalignment.         XR RIBS RIGHT INCLUDE CHEST (MIN 3 VIEWS)   Final Result   No acute abnormality of the ribs.      No acute process in the lungs.           Follow up with your family physician  If symptoms worsen or concern arises return for re-evaluation.
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
## 2025-03-26 PROBLEM — Z00.00 MEDICARE ANNUAL WELLNESS VISIT, SUBSEQUENT: Status: RESOLVED | Noted: 2025-02-24 | Resolved: 2025-03-26

## 2025-04-03 LAB — MAMMOGRAPHY, EXTERNAL: NORMAL

## 2025-05-17 DIAGNOSIS — E27.1 ADDISON DISEASE (HCC): ICD-10-CM

## 2025-05-17 DIAGNOSIS — E23.0 PANHYPOPITUITARISM: ICD-10-CM

## 2025-05-19 RX ORDER — HYDROCORTISONE 10 MG/1
TABLET ORAL
Qty: 145 TABLET | Refills: 0 | Status: SHIPPED | OUTPATIENT
Start: 2025-05-19

## 2025-05-19 NOTE — TELEPHONE ENCOUNTER
Last Appointment:  2/21/2025  Future Appointments   Date Time Provider Department Center   9/29/2025  7:45 AM Mark Randall MD [facility]

## 2025-08-11 DIAGNOSIS — E03.9 HYPOTHYROIDISM, UNSPECIFIED TYPE: ICD-10-CM

## 2025-08-11 RX ORDER — LEVOTHYROXINE SODIUM 50 UG/1
50 TABLET ORAL DAILY
Qty: 90 TABLET | Refills: 1 | Status: SHIPPED | OUTPATIENT
Start: 2025-08-11

## (undated) DEVICE — MICRODISSECTION NEEDLE STRAIGHT SLEEVE: Brand: COLORADO

## (undated) DEVICE — ELECTRODE PT RET AD L9FT HI MOIST COND ADH HYDRGEL CORDED

## (undated) DEVICE — TUBING SUCT 12FR MAL ALUM SHFT FN CAP VENT UNIV CONN W/ OBT

## (undated) DEVICE — PENCIL ES CRD L10FT HND SWCHING ROCK SWCH W/ EDGE COAT BLDE

## (undated) DEVICE — ENT MOUTH PROP

## (undated) DEVICE — BANDAGE,GAUZE,BULKEE II,4.5"X4.1YD,STRL: Brand: MEDLINE

## (undated) DEVICE — PACK PROCEDURE SURG GEN CUST

## (undated) DEVICE — NEEDLE HYPO 25GA L1.5IN BLU POLYPR HUB S STL REG BVL STR

## (undated) DEVICE — MAT SURG W36XL56IN GRN FOAM ANTIFATIGUE NONSLIP DRISAFE

## (undated) DEVICE — SYRINGE MED 10ML TRNSLUC BRL PLUNG BLK MRK POLYPR CTRL

## (undated) DEVICE — 4.0MM OVAL SOLID CARBIDE BUR MEDIUM

## (undated) DEVICE — Device

## (undated) DEVICE — BASIC SINGLE BASIN 1-LF: Brand: MEDLINE INDUSTRIES, INC.